# Patient Record
Sex: FEMALE | Race: WHITE | NOT HISPANIC OR LATINO | Employment: UNEMPLOYED | URBAN - METROPOLITAN AREA
[De-identification: names, ages, dates, MRNs, and addresses within clinical notes are randomized per-mention and may not be internally consistent; named-entity substitution may affect disease eponyms.]

---

## 2017-10-03 ENCOUNTER — OFFICE VISIT - RIVER FALLS (OUTPATIENT)
Dept: FAMILY MEDICINE | Facility: CLINIC | Age: 62
End: 2017-10-03

## 2017-11-09 ENCOUNTER — AMBULATORY - RIVER FALLS (OUTPATIENT)
Dept: FAMILY MEDICINE | Facility: CLINIC | Age: 62
End: 2017-11-09

## 2017-11-10 LAB
CHOLEST SERPL-MCNC: 269 MG/DL
CHOLEST/HDLC SERPL: 4.6 {RATIO}
CREAT SERPL-MCNC: 0.87 MG/DL (ref 0.5–0.99)
GLUCOSE BLD-MCNC: 92 MG/DL (ref 65–99)
HDLC SERPL-MCNC: 59 MG/DL
LDLC SERPL CALC-MCNC: 150 MG/DL
NONHDLC SERPL-MCNC: 210 MG/DL
TRIGL SERPL-MCNC: 388 MG/DL

## 2017-11-16 ENCOUNTER — OFFICE VISIT - RIVER FALLS (OUTPATIENT)
Dept: FAMILY MEDICINE | Facility: CLINIC | Age: 62
End: 2017-11-16

## 2017-11-16 ASSESSMENT — MIFFLIN-ST. JEOR: SCORE: 1280.13

## 2018-12-19 ENCOUNTER — AMBULATORY - RIVER FALLS (OUTPATIENT)
Dept: FAMILY MEDICINE | Facility: CLINIC | Age: 63
End: 2018-12-19

## 2018-12-20 LAB
CHOLEST SERPL-MCNC: 299 MG/DL
CHOLEST/HDLC SERPL: 4.2 {RATIO}
CREAT SERPL-MCNC: 1.01 MG/DL (ref 0.5–0.99)
GLUCOSE BLD-MCNC: 90 MG/DL (ref 65–99)
HDLC SERPL-MCNC: 71 MG/DL
LDLC SERPL CALC-MCNC: 192 MG/DL
NONHDLC SERPL-MCNC: 228 MG/DL
TRIGL SERPL-MCNC: 183 MG/DL

## 2018-12-28 ENCOUNTER — OFFICE VISIT - RIVER FALLS (OUTPATIENT)
Dept: FAMILY MEDICINE | Facility: CLINIC | Age: 63
End: 2018-12-28

## 2018-12-28 ASSESSMENT — MIFFLIN-ST. JEOR: SCORE: 1263.8

## 2019-08-21 ENCOUNTER — COMMUNICATION - RIVER FALLS (OUTPATIENT)
Dept: FAMILY MEDICINE | Facility: CLINIC | Age: 64
End: 2019-08-21

## 2020-02-18 ENCOUNTER — OFFICE VISIT - RIVER FALLS (OUTPATIENT)
Dept: FAMILY MEDICINE | Facility: CLINIC | Age: 65
End: 2020-02-18

## 2020-02-19 ENCOUNTER — COMMUNICATION - RIVER FALLS (OUTPATIENT)
Dept: FAMILY MEDICINE | Facility: CLINIC | Age: 65
End: 2020-02-19

## 2020-02-19 LAB
BUN SERPL-MCNC: 12 MG/DL (ref 7–25)
BUN/CREAT RATIO - HISTORICAL: ABNORMAL (ref 6–22)
CALCIUM SERPL-MCNC: 9.8 MG/DL (ref 8.6–10.4)
CHLORIDE BLD-SCNC: 100 MMOL/L (ref 98–110)
CHOLEST SERPL-MCNC: 304 MG/DL
CHOLEST/HDLC SERPL: 4.1 {RATIO}
CO2 SERPL-SCNC: 28 MMOL/L (ref 20–32)
CREAT SERPL-MCNC: 0.92 MG/DL (ref 0.5–0.99)
EGFRCR SERPLBLD CKD-EPI 2021: 66 ML/MIN/1.73M2
GLUCOSE BLD-MCNC: 100 MG/DL (ref 65–99)
HDLC SERPL-MCNC: 74 MG/DL
LDLC SERPL CALC-MCNC: 188 MG/DL
NONHDLC SERPL-MCNC: 230 MG/DL
POTASSIUM BLD-SCNC: 4 MMOL/L (ref 3.5–5.3)
SODIUM SERPL-SCNC: 139 MMOL/L (ref 135–146)
TRIGL SERPL-MCNC: 227 MG/DL
TSH SERPL DL<=0.005 MIU/L-ACNC: 0.82 MIU/L (ref 0.4–4.5)

## 2020-08-18 ENCOUNTER — OFFICE VISIT - RIVER FALLS (OUTPATIENT)
Dept: FAMILY MEDICINE | Facility: CLINIC | Age: 65
End: 2020-08-18

## 2020-08-19 ENCOUNTER — COMMUNICATION - RIVER FALLS (OUTPATIENT)
Dept: FAMILY MEDICINE | Facility: CLINIC | Age: 65
End: 2020-08-19

## 2020-08-19 LAB
CHOLEST SERPL-MCNC: 216 MG/DL
CHOLEST/HDLC SERPL: 3 {RATIO}
HDLC SERPL-MCNC: 73 MG/DL
LDLC SERPL CALC-MCNC: 105 MG/DL
NONHDLC SERPL-MCNC: 143 MG/DL
TRIGL SERPL-MCNC: 266 MG/DL

## 2020-09-01 ENCOUNTER — COMMUNICATION - RIVER FALLS (OUTPATIENT)
Dept: FAMILY MEDICINE | Facility: CLINIC | Age: 65
End: 2020-09-01

## 2021-03-02 ENCOUNTER — COMMUNICATION - RIVER FALLS (OUTPATIENT)
Dept: FAMILY MEDICINE | Facility: CLINIC | Age: 66
End: 2021-03-02

## 2021-04-05 ENCOUNTER — COMMUNICATION - RIVER FALLS (OUTPATIENT)
Dept: FAMILY MEDICINE | Facility: CLINIC | Age: 66
End: 2021-04-05

## 2021-04-14 ENCOUNTER — COMMUNICATION - RIVER FALLS (OUTPATIENT)
Dept: FAMILY MEDICINE | Facility: CLINIC | Age: 66
End: 2021-04-14

## 2021-04-15 ENCOUNTER — AMBULATORY - RIVER FALLS (OUTPATIENT)
Dept: FAMILY MEDICINE | Facility: CLINIC | Age: 66
End: 2021-04-15

## 2021-04-15 ENCOUNTER — OFFICE VISIT - RIVER FALLS (OUTPATIENT)
Dept: FAMILY MEDICINE | Facility: CLINIC | Age: 66
End: 2021-04-15

## 2021-04-15 ASSESSMENT — MIFFLIN-ST. JEOR: SCORE: 1252.01

## 2021-04-16 ENCOUNTER — COMMUNICATION - RIVER FALLS (OUTPATIENT)
Dept: FAMILY MEDICINE | Facility: CLINIC | Age: 66
End: 2021-04-16

## 2021-04-16 LAB
BUN SERPL-MCNC: 12 MG/DL (ref 7–25)
BUN/CREAT RATIO - HISTORICAL: 12 (ref 6–22)
CALCIUM SERPL-MCNC: 10.1 MG/DL (ref 8.6–10.4)
CHLORIDE BLD-SCNC: 103 MMOL/L (ref 98–110)
CHOLEST SERPL-MCNC: 202 MG/DL
CHOLEST/HDLC SERPL: 2.8 {RATIO}
CO2 SERPL-SCNC: 26 MMOL/L (ref 20–32)
CREAT SERPL-MCNC: 1.02 MG/DL (ref 0.5–0.99)
EGFRCR SERPLBLD CKD-EPI 2021: 58 ML/MIN/1.73M2
GLUCOSE BLD-MCNC: 102 MG/DL (ref 65–99)
HDLC SERPL-MCNC: 73 MG/DL
LDLC SERPL CALC-MCNC: 104 MG/DL
NONHDLC SERPL-MCNC: 129 MG/DL
POTASSIUM BLD-SCNC: 4.2 MMOL/L (ref 3.5–5.3)
SODIUM SERPL-SCNC: 139 MMOL/L (ref 135–146)
TRIGL SERPL-MCNC: 156 MG/DL
TSH SERPL DL<=0.005 MIU/L-ACNC: 0.82 MIU/L (ref 0.4–4.5)

## 2021-05-13 ENCOUNTER — AMBULATORY - RIVER FALLS (OUTPATIENT)
Dept: FAMILY MEDICINE | Facility: CLINIC | Age: 66
End: 2021-05-13

## 2021-11-10 ENCOUNTER — OFFICE VISIT - RIVER FALLS (OUTPATIENT)
Dept: FAMILY MEDICINE | Facility: CLINIC | Age: 66
End: 2021-11-10

## 2021-11-10 ASSESSMENT — MIFFLIN-ST. JEOR: SCORE: 1236.13

## 2022-02-11 VITALS
WEIGHT: 168.6 LBS | SYSTOLIC BLOOD PRESSURE: 138 MMHG | TEMPERATURE: 98.3 F | HEART RATE: 64 BPM | OXYGEN SATURATION: 98 % | DIASTOLIC BLOOD PRESSURE: 76 MMHG | BODY MASS INDEX: 30.59 KG/M2

## 2022-02-11 VITALS
HEIGHT: 62 IN | WEIGHT: 166.4 LBS | TEMPERATURE: 98 F | HEART RATE: 69 BPM | OXYGEN SATURATION: 98 % | SYSTOLIC BLOOD PRESSURE: 138 MMHG | BODY MASS INDEX: 30.62 KG/M2 | DIASTOLIC BLOOD PRESSURE: 58 MMHG

## 2022-02-11 VITALS
TEMPERATURE: 97.6 F | DIASTOLIC BLOOD PRESSURE: 70 MMHG | HEIGHT: 62 IN | HEART RATE: 62 BPM | RESPIRATION RATE: 20 BRPM | SYSTOLIC BLOOD PRESSURE: 152 MMHG | DIASTOLIC BLOOD PRESSURE: 84 MMHG | BODY MASS INDEX: 31.53 KG/M2 | SYSTOLIC BLOOD PRESSURE: 138 MMHG | WEIGHT: 172.6 LBS | HEIGHT: 62 IN | HEART RATE: 60 BPM | BODY MASS INDEX: 31.76 KG/M2 | TEMPERATURE: 98.7 F

## 2022-02-11 VITALS
OXYGEN SATURATION: 98 % | DIASTOLIC BLOOD PRESSURE: 76 MMHG | HEIGHT: 62 IN | BODY MASS INDEX: 29.98 KG/M2 | HEART RATE: 67 BPM | TEMPERATURE: 98.1 F | WEIGHT: 162.9 LBS | SYSTOLIC BLOOD PRESSURE: 168 MMHG

## 2022-02-11 VITALS
SYSTOLIC BLOOD PRESSURE: 138 MMHG | WEIGHT: 164 LBS | HEART RATE: 63 BPM | TEMPERATURE: 98.3 F | DIASTOLIC BLOOD PRESSURE: 80 MMHG | BODY MASS INDEX: 29.76 KG/M2 | OXYGEN SATURATION: 98 %

## 2022-02-11 VITALS
BODY MASS INDEX: 31.1 KG/M2 | SYSTOLIC BLOOD PRESSURE: 128 MMHG | HEART RATE: 70 BPM | HEIGHT: 62 IN | WEIGHT: 169 LBS | TEMPERATURE: 98.6 F | DIASTOLIC BLOOD PRESSURE: 72 MMHG

## 2022-02-16 NOTE — LETTER
(Inserted Image. Unable to display)            April 16, 2021      ZE CASTLE      [address] [place] [id number]        Dear ZE,    Thank you for selecting [facility] for your healthcare needs.  Below you will find the results of the recent tests done at our clinic.      Labs are all acceptable, Ze.  The thyroid level is in normal range, I sent refills for your current dose of levothyroxine to the pharmacy for one year.       Result Name Current Result Previous Result Reference Range   Sodium Level (mmol/L)  139 4/15/2021  139 2/18/2020 135 - 146   Potassium Level (mmol/L)  4.2 4/15/2021  4.0 2/18/2020 3.5 - 5.3   Chloride Level (mmol/L)  103 4/15/2021  100 2/18/2020 98 - 110   CO2 Level (mmol/L)  26 4/15/2021  28 2/18/2020 20 - 32   Glucose Level (mg/dL) ((H)) 102 4/15/2021 ((H)) 100 2/18/2020 65 - 99   BUN (mg/dL)  12 4/15/2021  12 2/18/2020 7 - 25   Creatinine Level (mg/dL) ((H)) 1.02 4/15/2021  0.92 2/18/2020 0.50 - 0.99   BUN/Creat Ratio  12 4/15/2021  NOT APPLICABLE 2/18/2020 6 - 22   eGFR (mL/min/1.73m2) ((L)) 58 4/15/2021  66 2/18/2020 > OR = 60 -    eGFR  (mL/min/1.73m2)  67 4/15/2021  76 2/18/2020 > OR = 60 -    Calcium Level (mg/dL)  10.1 4/15/2021  9.8 2/18/2020 8.6 - 10.4   Cholesterol (mg/dL) ((H)) 202 4/15/2021 ((H)) 216 8/18/2020  - <200   Non-HDL Cholesterol  129 4/15/2021 ((H)) 143 8/18/2020  - <130   HDL (mg/dL)  73 4/15/2021  73 8/18/2020 > OR = 50 -    Cholesterol/HDL Ratio  2.8 4/15/2021  3.0 8/18/2020  - <5.0   LDL ((H)) 104 4/15/2021 ((H)) 105 8/18/2020    Triglyceride (mg/dL) ((H)) 156 4/15/2021 ((H)) 266 8/18/2020  - <150   TSH (mIU/L)  0.82 4/15/2021  0.82 2/18/2020 0.40 - 4.50       Please contact me or my assistant at [phone] if you have any questions or concerns.     Sincerely,        NANDINI Bacon-NP  Family Nurse Practitioner      What do your labs mean?  Below is a glossary of commonly ordered labs:  LDL   Bad  Cholesterol   HDL   Good Cholesterol  AST/ALT   Liver Function   Cr/Creatinine   Kidney Function  Microalbumin   Kidney Function  BUN   Kidney Function  PSA   Prostate    TSH   Thyroid Hormone  HgbA1c   Diabetes Test   Hgb (Hemoglobin)   Red Blood Cells  WBC   White Blood Cell Count

## 2022-02-16 NOTE — TELEPHONE ENCOUNTER
Entered by April Lunsford CMA on January 06, 2020 6:53:40 AM CST  Med Refill      Date of last office visit and reason:  12/28/18; med check      Date of last Med Check / Px:   12/28/18  Date of last labs pertaining to med:  12/19/18    RTC order in chart:  yes; due now    For Protocol refill, has patient been contacted:  msg sent to pharmacy        ------------------------------------------  From: [facility]/pharmacy #[id number]  To: Emmy Pa  Sent: January 5, 2020 11:12:52 AM CST  Subject: Medication Management  Due: January 6, 2020 11:12:52 AM CST    ** On Hold Pending Signature **  Drug: levothyroxine (levothyroxine 125 mcg (0.125 mg) oral tablet)  TAKE 1 TABLET BY MOUTH EVERY DAY  Quantity: 90 tab(s)  Days Supply: 90  Refills: 3  Substitutions Allowed  Notes from Pharmacy:     Dispensed Drug: levothyroxine (levothyroxine 125 mcg (0.125 mg) oral tablet)  TAKE 1 TABLET BY MOUTH EVERY DAY  Quantity: 90 tab(s)  Days Supply: 90  Refills: 3  Substitutions Allowed  Notes from Pharmacy:   ------------------------------------------

## 2022-02-16 NOTE — LETTER
(Inserted Image. Unable to display)   October 20, 2021    ZE CASTLE  [address] [place] [phone]            Dear ZE,      Thank you for selecting [facility] for your healthcare needs.    Our records indicate you are due for the following services:     Medicare Annual Wellness Visit.    (FYI   Regarding office visits: In some instances, a video visit or telephone visit may be offered as an option.)      To schedule an appointment or if you have further questions, please contact your clinic at [phone].      Powered by [employer]    Sincerely,    CARLOS Whitaker

## 2022-02-16 NOTE — NURSING NOTE
Faxed completed Cologuard order form, demographic sheet, and insurance information to "[employer] at [phone]. Confirmation received.

## 2022-02-16 NOTE — TELEPHONE ENCOUNTER
Entered by Patricia Canada MA on March 08, 2021 10:18:33 AM CST  ---------------------  From: Patricia Canada MA   To: [facility] #[id number]    Sent: 3/8/2021 10:18:33 AM CST  Subject: Medication Management     ** Submitted: **  Order:levothyroxine (levothyroxine 125 mcg (0.125 mg) oral tablet)  1 tab(s)  Oral  daily  Qty:  30 tab(s)        Refills:  0          Substitutions Allowed     [id number]    Signed by Patricia Canada MA  3/8/2021 4:18:00 PM [facility]    ** Submitted: **  Complete:levothyroxine (levothyroxine 125 mcg (0.125 mg) oral tablet)   Signed by Patricia Canada MA  3/8/2021 4:18:00 PM [facility]    ** Not Approved:  **  levothyroxine (LEVOTHYROXINE 125 MCG TABLET)  TAKE 1 TABLET BY MOUTH EVERY DAY  Qty:  90 tab(s)        Days Supply:  90        Refills:  2          Substitutions Allowed     Route To Pharmacy - [facility] #[id number]   Signed by Patricia Canada MA            ** Patient matched by Patricia Canada MA on 3/8/2021 10:16:41 AM CST **      ------------------------------------------  From: [facility] STORE [facility]  To: Emmy Pa  Sent: March 6, 2021 9:25:01 AM CST  Subject: Medication Management  Due: March 3, 2021 5:24:05 PM CST     ** On Hold Pending Signature **     Dispensed Drug: levothyroxine (levothyroxine 125 mcg (0.125 mg) oral tablet), TAKE 1 TABLET BY MOUTH EVERY DAY  Quantity: 90 tab(s)  Days Supply: 90  Refills: 2  Substitutions Allowed  Notes from Pharmacy:  ------------------------------------------LV:  8/18/2020 w/ NCB for CDM f/u.  Please see 3/5/2021 phone note, pt aware of appt, in [place] until mid April

## 2022-02-16 NOTE — NURSING NOTE
Asthma Control Test (ACT) Total Entered On:  4/15/2021 12:58 PM CDT    Performed On:  4/15/2021 12:58 PM CDT by Nereida Chairez LPN               Asthma Control Test (ACT) Total   Asthma Control Test Total (Adult) :   22    Asthma Action Plan Provided? :   Yes   Nereida Chairez LPN - 4/15/2021 12:58 PM CDT

## 2022-02-16 NOTE — TELEPHONE ENCOUNTER
Entered by Patricia Canada MA on October 18, 2021 11:38:14 AM CDT  ---------------------  From: Patricia Canada MA   To: [place], [place]    Sent: 10/18/2021 11:38:13 AM CDT  Subject: Medication Management     ** Not Approved: Refill not appropriate, Refilled // for #30.  Pt due for appt for further refills. **  amLODIPine (AMLODIPINE BESYLATE 10MG TAB)  TAKE ONE TABLET BY MOUTH EVERY DAY  Qty:  90 tab(s)        Days Supply:  90        Refills:  1          Substitutions Allowed     Route To Pharmacy - [place], [place]   Signed by Patricia Canada MA            ** Patient matched by Patricia Canada MA on 10/18/2021 11:37:09 AM CDT **      ------------------------------------------  From: [place], [place]  To: Emmy Pa  Sent: October 13, 2021 8:15:46 AM CDT  Subject: Medication Management  Due: October 2, 2021 3:22:27 PM CDT     ** On Hold Pending Signature **     Drug: amLODIPine (Norvasc 10 mg oral tablet), TAKE ONE TABLET BY MOUTH EVERY DAY  Quantity: 90 tab(s)  Days Supply: 90  Refills: 0  Substitutions Allowed  Notes from Pharmacy:     Dispensed Drug: amLODIPine (amLODIPine 10 mg oral tablet), TAKE ONE TABLET BY MOUTH EVERY DAY  Quantity: 90 tab(s)  Days Supply: 90  Refills: 1  Substitutions Allowed  Notes from Pharmacy:  ------------------------------------------

## 2022-02-16 NOTE — PROGRESS NOTES
Patient:   ZE CASTLE            MRN: [id number]            FIN: [id number]               Age:   64 years     Sex:  Female     :  1955   Associated Diagnoses:   Asthma, mild intermittent; Hyperlipidemia; Hypertension; Hypothyroidism   Author:   Emmy Pa      Visit Information      Date of Service: 2020 08:31 am  Performing Location: [facility]  Encounter#: [id number]      Primary Care Provider (PCP):  ROCIO -UNKNOWN,    NPI# [id number]      Referring Provider:  Emmy Pa    NPI# [id number]      Chief Complaint   2020 8:40 AM CST    med check/refills - is due for some labs today        History of Present Illness   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  She ran out of potassium, so using an OTC sub  She ran out of Losartan about 5 days ago, she has questions about the recall. She does not like being on her bp meds but will continue  She has no SOB or chest pain or ankle swelling  does not want to be on a statin, has reduced cholesterol intake as eliminating some did help with her   asthma sympotms which are well controlled, see ACT and AAP  non smoker      Health Status   Allergies:    Allergic Reactions (Selected)  Severity Not Documented  Mold (No reactions were documented)   Medications:  (Selected)   Prescriptions  Prescribed  Metoprolol Succinate  mg oral tablet, extended release: See Instructions, Instructions: TAKE 1 TABLET BY MOUTH EVERY DAY, # 90 tab(s), 1 Refill(s), KLARISSA, Type: Maintenance, Pharmacy: [facility] STORE [id number], TAKE 1 TABLET BY MOUTH EVERY DAY  albuterol 90 mcg/inh inhalation aerosol: 2 puff(s), NEB, q4 hrs, PRN: AS NEEDED FOR SHORTNESS OF BREATH OR WHEEZING, # 1 EA, 2 Refill(s), Type: Maintenance, Pharmacy: [facility]/pharmacy #[id number], 2 puff(s) NEB q4 hrs,PRN:AS NEEDED FOR SHORTNESS OF BREATH OR WHEEZING  amLODIPine 10 mg oral tablet: = 1 tab(s) ( 10 mg ), Oral, daily, # 90  tab(s), 1 Refill(s), Type: Maintenance, [id number], 1 tab(s) Oral daily  hydroCHLOROthiazide 25 mg oral tablet: = 1 tab(s) ( 25 mg ), Oral, daily, # 90 tab(s), 1 Refill(s), Type: Maintenance, Pharmacy: [facility]/pharmacy #[id number], 1 tab(s) Oral daily  levothyroxine 125 mcg (0.125 mg) oral tablet: = 1 tab(s), Oral, daily, # 30 tab(s), 0 Refill(s), Type: Maintenance, Pharmacy: [facility]/pharmacy #[id number], Needs appt for further refills  potassium chloride 10 mEq oral capsule, extended release: = 1 cap(s) ( 10 mEq ), PO, daily, # 90 cap(s), 3 Refill(s), Type: Maintenance, [id number], 1 cap(s) Oral daily  valsartan 80 mg oral tablet: = 1 tab(s) ( 80 mg ), Oral, daily, Instructions: to replace losartan, # 90 tab(s), 1 Refill(s), Type: Maintenance, [id number], 1 tab(s) Oral daily,Instr:to replace losartan   Problem list:    All Problems  Aortic valve calcification / SNOMED CT 972198692 / Confirmed  Asthma, mild intermittent / SNOMED CT 6672501636 / Confirmed  Ex-smoker / SNOMED CT 60737980 / Confirmed  Hypertension / SNOMED CT 7480216957 / Confirmed  Hypothyroidism / SNOMED CT 590622929 / Confirmed  Currently euthyroid  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  Obese / SNOMED CT 5004076950 / Probable  Osteopenia / SNOMED CT 732510262 / Confirmed  Seasonal allergies / SNOMED CT 911005503 / Confirmed  Inactive: Eczema / SNOMED CT 04258107  Inactive: Finger mass, left / SNOMED CT 402710061  Inactive: Has received third dose of hepatitis B vaccine / SNOMED CT 7920265122  Inactive: Irritant contact dermatitis versus autoimmune process  Dermatology consult on 05/10/2005  Resolved: Mild persistent asthma / SNOMED CT 3598013775  Resolved: Rash, skin / SNOMED CT 540259948  Canceled: Asthma  Childhood  Canceled: Eczema  Canceled: Eczema  Canceled: Osteopenia      Histories   Past Medical History:    Active  Keratoacanthoma  (405943637): Onset in  at 52 years.  Comments:  2011 CST 11:38 AM Gilda Cox  Not definitive diagnosis, suspected, right arm.  Hypertension (7913549754)  Asthma, mild intermittent (9315275962)  Ex-smoker (41761482)  Hypothyroidism (263122379)  Comments:  2011 CST 10:38 AM Gilda Cox  Currently euthyroid  Seasonal allergies (558156517)  Osteopenia (156009358)  Obese (1373810599)  Aortic valve calcification (587537824)  Resolved  Rash, skin (216647848): Onset in the month of 2005 at 49 years  Resolved.  Mild persistent asthma (5281427442):  Resolved.   Family History:    Hypothyroid  Sister  Hypertension  Mother  Brother  Osteoporosis  Mother  Grandmother (M)     Procedure history:    Repair of shoulder (SNOMED CT 716295910) in  at 36 Years.  Comments:  2011 11:51 AM Gilda Cox  Repair of left shoulder dislocation.  Childbirth (SNOMED CT 5403859198) in  at 21 Years.  Comments:  2011 11:46 AM Gilda Cox  .  Toxemia at very end.  Tubal ligation (SNOMED CT 456500883).  Cardiac computed tomography for calcium scoring (SNOMED CT 2699558864).  Comments:  9/10/2014 9:51 AM [facility] - Cinthya Lopez CMA  Done 2014 - results Zero  Do an Echo in 1-2 years because of mild degeneration in the aortic valve   Social History:        Alcohol Assessment            Wine, 1-2 times per week      Substance Abuse Assessment            Never      Other Assessment            Immunization:, Per paper chart patient completed Hepatitis B series in . No dates. Not listed on WIR.            Marital Status,         Physical Examination   Vital Signs   2020 8:40 AM CST Temperature Tympanic 98.3 DegF    Peripheral Pulse Rate 63 bpm    Systolic Blood Pressure 142 mmHg  HI    Diastolic Blood Pressure 74 mmHg    Mean Arterial Pressure 97 mmHg    BP Site Right arm    BP Method Manual    Oxygen Saturation 98 %      Measurements from flowsheet : Measurements   2020 8:40 AM  CST    Weight Measured - Standard                164 lb     General:  Alert and oriented, No acute distress.    Eye:  Normal conjunctiva.    HENT:  Normal hearing, Oral mucosa is moist, No pharyngeal erythema.    Neck:  Supple, Non-tender, No lymphadenopathy.    Respiratory:  Lungs are clear to auscultation, Respirations are non-labored, Breath sounds are equal, Symmetrical chest wall expansion.    Cardiovascular:  Normal rate, Regular rhythm, No murmur.    Musculoskeletal:  Normal range of motion, Normal gait.    Integumentary:  Warm, Dry, Pink, No rash.    Neurologic:  Alert, Oriented.    Psychiatric:  Cooperative.       Impression and Plan   Diagnosis     Asthma, mild intermittent (ACB59-PN J45.20).     Hyperlipidemia (KUV12-RE E78.5).     Hypertension (IAF23-EY I10).     Hypothyroidism (JBY08-MA E06.3).     Plan:  will check potassium and if normal range, she can continue with OTC supplement, I did sent rx KCL if not in range.  await TSH, thyroid seems under good control  BP--will stop Losartan and change to Valsartan  recheck meds 6 months.    Patient Instructions:       Counseled: Patient, Regarding diagnosis, Regarding treatment, Regarding medications, Verbalized understanding.    Orders     Orders (Selected)   Outpatient Orders  Ordered (Dispatched)  Basic Metabolic Panel* ([facility]): Specimen Type: Serum, Collection Date: 02/18/20 9:15:00 CST  Lipid panel with reflex to direct ldl* ([facility]): Specimen Type: Serum, Collection Date: 02/18/20 9:15:00 CST  TSH* ([facility]): Specimen Type: Serum, Collection Date: 02/18/20 9:15:00 CST  Prescriptions  Prescribed  Metoprolol Succinate  mg oral tablet, extended release: See Instructions, Instructions: TAKE 1 TABLET BY MOUTH EVERY DAY, # 90 tab(s), 1 Refill(s), KLARISSA, Type: Maintenance, Pharmacy: [facility] STORE [id number], TAKE 1 TABLET BY MOUTH EVERY DAY  albuterol 90 mcg/inh inhalation aerosol: 2 puff(s), NEB, q4 hrs, PRN: AS NEEDED FOR SHORTNESS OF BREATH OR WHEEZING, # 1 EA, 2  Refill(s), Type: Maintenance, Pharmacy: [facility]/pharmacy #[id number], 2 puff(s) NEB q4 hrs,PRN:AS NEEDED FOR SHORTNESS OF BREATH OR WHEEZING  amLODIPine 10 mg oral tablet: = 1 tab(s) ( 10 mg ), Oral, daily, # 90 tab(s), 1 Refill(s), Type: Maintenance, Pharmacy: [facility]/pharmacy #[id number], 1 tab(s) Oral daily  hydroCHLOROthiazide 25 mg oral tablet: = 1 tab(s) ( 25 mg ), Oral, daily, # 90 tab(s), 1 Refill(s), Type: Maintenance, Pharmacy: [facility]/pharmacy #[id number], 1 tab(s) Oral daily  levothyroxine 125 mcg (0.125 mg) oral tablet: = 1 tab(s), Oral, daily, # 30 tab(s), 0 Refill(s), Type: Maintenance, Pharmacy: [facility]/pharmacy #[id number], Needs appt for further refills  potassium chloride 10 mEq oral capsule, extended release: = 1 cap(s) ( 10 mEq ), PO, daily, # 90 cap(s), 3 Refill(s), Type: Maintenance, Pharmacy: [facility]/pharmacy #[id number], 1 cap(s) Oral daily  valsartan 80 mg oral tablet: = 1 tab(s) ( 80 mg ), Oral, daily, Instructions: to replace losartan, # 90 tab(s), 1 Refill(s), Type: Maintenance, Pharmacy: [facility]/pharmacy #[id number], 1 tab(s) Oral daily,Instr:to replace losartan.

## 2022-02-16 NOTE — TELEPHONE ENCOUNTER
Entered by April Lunsford CMA on April 20, 2021 2:53:28 PM CDT  ---------------------  From: April Lunsford CMA   To: [facility]/pharmacy #[id number]    Sent: 4/20/2021 2:53:28 PM CDT  Subject: Medication Management     ** Rx Change Denied: Change not appropriate, #90, 3 sent 4/16/21 **  (LEVOTHYROXINE 125 MCG TABLET)   TAKE 1 TABLET BY MOUTH EVERY DAY  Qty:  30 tab(s)        Days Supply:  30        Refills:  0          Substitutions Allowed     Route To Pharmacy - [facility]/pharmacy #[id number]   Note from Pharmacy:  REQUEST FOR 90 DAYS PRESCRIPTION.  Signed by April Lunsford CMA            From: [facility] STORE [id number]  To: Emmy Pa  Sent: April 19, 2021 9:10:24 AM CDT  Subject: Medication Management  Due: April 20, 2021 9:10:24 AM CDT     Originally Prescribed Drug:  Drug: levothyroxine (levothyroxine 125 mcg (0.125 mg) oral tablet), TAKE 1 TABLET BY MOUTH EVERY DAY  Quantity: 30 tab(s)  Days Supply: 30  Refills: 0  Substitutions Allowed  Notes from Pharmacy: REQUEST FOR 90 DAYS PRESCRIPTION.     ** On Hold Pending Signature **  Preferred Alternative Drug: levothyroxine (levothyroxine 125 mcg (0.125 mg) oral tablet), TAKE 1 TABLET BY MOUTH EVERY DAY  Quantity: 90 tab(s)  Days Supply: 90  Refills: 1  Substitutions Allowed  Notes from Pharmacy: REQUEST FOR 90 DAYS PRESCRIPTION.

## 2022-02-16 NOTE — NURSING NOTE
Depression Screening Entered On:  2/18/2020 9:25 AM CST    Performed On:  2/18/2020 9:24 AM CST by Nereida Chairez LPN               Depression Screening   Little Interest - Pleasure in Activities :   Not at all   Feeling Down, Depressed, Hopeless :   Not at all   Initial Depression Screen Score :   0    Trouble Falling or Staying Asleep :   Not at all   Feeling Tired or Little Energy :   Not at all   Poor Appetite or Overeating :   Not at all   Feeling Bad About Yourself :   Not at all   Trouble Concentrating :   Not at all   Moving or Speaking Slowly :   Not at all   Thoughts Better Off Dead or Hurting Self :   Not at all   Detailed Depression Screen Score :   0    Total Depression Screen Score :   0    BRENT Difficulty with Work, Home, Others :   Not difficult at all   Nereida Chairez LPN - 2/18/2020 9:24 AM CST

## 2022-02-16 NOTE — TELEPHONE ENCOUNTER
Entered by Laura Kothari CMA on April 14, 2021 10:51:59 AM CDT  ---------------------  From: Laura Kothari CMA   To: [facility]/pharmacy #[id number]    Sent: 4/14/2021 10:51:58 AM CDT  Subject: Medication Management     ** Not Approved: Pt has appt tomorrow **  potassium chloride (POTASSIUM CL ER 10 MEQ CAPSULE)  TAKE 1 CAPSULE BY MOUTH EVERY DAY  Qty:  30 cap(s)        Days Supply:  30        Refills:  0          Substitutions Allowed     Route To Pharmacy - [facility]/pharmacy #[id number]   Signed by Laura Kothari CMA            ** Patient matched by Laura Kothari CMA on 4/14/2021 10:45:42 AM CDT **      ------------------------------------------  From: [facility] STORE [id number]  To: Emmy Pa  Sent: April 12, 2021 11:59:27 AM CDT  Subject: Medication Management  Due: April 7, 2021 1:44:44 PM CDT     ** On Hold Pending Signature **     Dispensed Drug: potassium chloride (potassium chloride 10 mEq oral capsule, extended release), TAKE 1 CAPSULE BY MOUTH EVERY DAY  Quantity: 30 cap(s)  Days Supply: 30  Refills: 0  Substitutions Allowed  Notes from Pharmacy:  ------------------------------------------

## 2022-02-16 NOTE — NURSING NOTE
Asthma Assessment Entered On:  4/15/2021 10:36 AM CDT    Performed On:  4/15/2021 10:36 AM CDT by Emmy Pa               History   Asthma Severity :   Intermittent   Emmy Pa - 4/15/2021 10:36 AM CDT   Asthma Precipitating Factors Grid   Upper Respiratory Infection :   Yes   Emmy Pa - 4/15/2021 10:36 AM CDT

## 2022-02-16 NOTE — NURSING NOTE
Comprehensive Intake Entered On:  2/18/2020 8:43 AM CST    Performed On:  2/18/2020 8:40 AM CST by Nereida Chairez LPN               Summary   Chief Complaint :   med check/refills - is due for some labs today   Weight Measured :   164 lb(Converted to: 164 lb 0 oz, 74.39 kg)    Systolic Blood Pressure :   142 mmHg (HI)    Diastolic Blood Pressure :   74 mmHg   Mean Arterial Pressure :   97 mmHg   Peripheral Pulse Rate :   63 bpm   BP Site :   Right arm   BP Method :   Manual   Temperature Tympanic :   98.3 DegF(Converted to: 36.8 DegC)    Oxygen Saturation :   98 %   Nereida Chairez LPN - 2/18/2020 8:40 AM CST   Health Status   Allergies Verified? :   Yes   Medication History Verified? :   Yes   Medical History Verified? :   No   Pre-Visit Planning Status :   Completed   Tobacco Use? :   Former smoker   Nereida Chairez LPN - 2/18/2020 8:40 AM CST   Meds / Allergies   (As Of: 2/18/2020 8:43:54 AM CST)   Allergies (Active)   Mold  Estimated Onset Date:   Unspecified ; Created By:   Gilda Arango; Reaction Status:   Active ; Category:   Drug ; Substance:   Mold ; Type:   Allergy ; Updated By:   Gilda Arango; Reviewed Date:   12/28/2018 9:25 AM CST        Medication List   (As Of: 2/18/2020 8:43:54 AM CST)   Prescription/Discharge Order    potassium chloride  :   potassium chloride ; Status:   Prescribed ; Ordered As Mnemonic:   potassium chloride 10 mEq oral capsule, extended release ; Simple Display Line:   10 mEq, 1 cap(s), PO, daily, for 90 day(s), 90 cap(s), 0 Refill(s) ; Ordering Provider:   Emmy Pa; Catalog Code:   potassium chloride ; Order Dt/Tm:   12/28/2018 9:43:10 AM CST          amLODIPine  :   amLODIPine ; Status:   Prescribed ; Ordered As Mnemonic:   amLODIPine 10 mg oral tablet ; Simple Display Line:   10 mg, 1 tab(s), Oral, daily, 30 tab(s), 0 Refill(s) ; Ordering Provider:   Emmy Pa; Catalog Code:   amLODIPine ; Order Dt/Tm:   2/3/2020 8:00:58 AM CST          losartan  :    losartan ; Status:   Prescribed ; Ordered As Mnemonic:   losartan 100 mg oral tablet ; Simple Display Line:   100 mg, 1 tab(s), Oral, daily, 30 tab(s), 0 Refill(s) ; Ordering Provider:   Emmy Pa; Catalog Code:   losartan ; Order Dt/Tm:   2/3/2020 8:00:58 AM CST          hydroCHLOROthiazide  :   hydroCHLOROthiazide ; Status:   Prescribed ; Ordered As Mnemonic:   hydroCHLOROthiazide 25 mg oral tablet ; Simple Display Line:   25 mg, 1 tab(s), Oral, daily, 30 tab(s), 0 Refill(s) ; Ordering Provider:   Emmy Pa; Catalog Code:   hydroCHLOROthiazide ; Order Dt/Tm:   2/3/2020 8:00:58 AM CST          levothyroxine  :   levothyroxine ; Status:   Prescribed ; Ordered As Mnemonic:   levothyroxine 125 mcg (0.125 mg) oral tablet ; Simple Display Line:   1 tab(s), Oral, daily, 30 tab(s), 0 Refill(s) ; Ordering Provider:   Emmy Pa; Catalog Code:   levothyroxine ; Order Dt/Tm:   2/3/2020 8:00:37 AM CST          albuterol  :   albuterol ; Status:   Prescribed ; Ordered As Mnemonic:   albuterol 90 mcg/inh inhalation aerosol ; Simple Display Line:   2 puff(s), NEB, q4 hrs, PRN: AS NEEDED FOR SHORTNESS OF BREATH OR WHEEZING, 1 EA, 2 Refill(s) ; Ordering Provider:   Emmy Pa; Catalog Code:   albuterol ; Order Dt/Tm:   8/21/2019 8:54:49 AM CDT          metoprolol  :   metoprolol ; Status:   Prescribed ; Ordered As Mnemonic:   Metoprolol Succinate  mg oral tablet, extended release ; Simple Display Line:   See Instructions, TAKE 1 TABLET BY MOUTH EVERY DAY, 90 tab(s), 1 Refill(s) ; Ordering Provider:   Emmy Pa; Catalog Code:   metoprolol ; Order Dt/Tm:   2/18/2020 7:55:26 AM CST

## 2022-02-16 NOTE — NURSING NOTE
Asthma Control Test (ACT) Total Entered On:  2/19/2020 1:48 PM CST    Performed On:  2/19/2020 1:48 PM CST by Nereida Chairez LPN               Asthma Control Test (ACT) Total   Asthma Control Test Total (Adult) :   24    Asthma Action Plan Provided? :   No   Nereida Chairez LPN - 2/19/2020 1:48 PM CST

## 2022-02-16 NOTE — TELEPHONE ENCOUNTER
Entered by Dinora Mckeon CMA on August 12, 2020 8:59:54 AM CDT  ---------------------  From: Dinora Mckeon CMA   To: [facility]/pharmacy #[id number]    Sent: 8/12/2020 8:59:53 AM CDT  Subject: Medication Management     ** Submitted: **  Order:atorvastatin (atorvastatin 20 mg oral tablet)  1 tab(s)  Oral  daily  Qty:  30 tab(s)        Refills:  0          Substitutions Allowed     Route To Pharmacy - [facility]/pharmacy #[id number]    Signed by Dinora Mckeon CMA  8/12/2020 1:59:00 PM [facility]    ** Submitted: **  Complete:atorvastatin (atorvastatin 20 mg oral tablet)   Signed by Dinora Mckeon CMA  8/12/2020 1:59:00 PM [facility]    ** Not Approved:  **  atorvastatin (ATORVASTATIN 20 MG TABLET)  TAKE 1 TABLET BY MOUTH EVERY DAY  Qty:  90 tab(s)        Days Supply:  90        Refills:  1          Substitutions Allowed     Route To Pharmacy - [facility]/pharmacy #[id number]   Signed by Dinora Mckeon CMA            ------------------------------------------  From: [facility] STORE [id number]  To: Emmy Pa  Sent: August 12, 2020 12:23:31 AM CDT  Subject: Medication Management  Due: August 12, 2020 4:17:26 PM CDT     ** On Hold Pending Signature **     Dispensed Drug: atorvastatin (atorvastatin 20 mg oral tablet), TAKE 1 TABLET BY MOUTH EVERY DAY  Quantity: 90 tab(s)  Days Supply: 90  Refills: 1  Substitutions Allowed  Notes from Pharmacy:  ------------------------------------------LDVM for patient at 0900- due for Med check per [facility]. 30 day supply sent to hold patient over. Pt advised to schedule appt.

## 2022-02-16 NOTE — LETTER
(Inserted Image. Unable to display)   August 19, 2020      ZE CASTLE      [address] [place], [place] [id number]        Dear ZE,    Thank you for selecting [facility] for your healthcare needs.  Below you will find the results of the recent tests done at our clinic.      Your lab results show significant improvement which should correlate to reduction of cardiovascular risk, Ze.  Continue your current dose of meds. Thank you.      Result Name Current Result Previous Result Reference Range   Cholesterol (mg/dL) ((H)) 216 8/18/2020 ((H)) 304 2/18/2020  - <200   Non-HDL Cholesterol ((H)) 143 8/18/2020 ((H)) 230 2/18/2020  - <130   HDL (mg/dL)  73 8/18/2020  74 2/18/2020 > OR = 50 -    Cholesterol/HDL Ratio  3.0 8/18/2020  4.1 2/18/2020  - <5.0   LDL ((H)) 105 8/18/2020 ((H)) 188 2/18/2020    Triglyceride (mg/dL) ((H)) 266 8/18/2020 ((H)) 227 2/18/2020  - <150       Please contact me or my assistant at [phone] if you have any questions or concerns.     Sincerely,        CARLOS Bacon  Family Nurse Practitioner      What do your labs mean?  Below is a glossary of commonly ordered labs:  LDL   Bad Cholesterol   HDL   Good Cholesterol  AST/ALT   Liver Function   Cr/Creatinine   Kidney Function  Microalbumin   Kidney Function  BUN   Kidney Function  PSA   Prostate    TSH   Thyroid Hormone  HgbA1c   Diabetes Test   Hgb (Hemoglobin)   Red Blood Cells  WBC   White Blood Cell Count

## 2022-02-16 NOTE — PROGRESS NOTES
Patient:   ZE CASTLE            MRN: [id number]            FIN: [id number]               Age:   61 years     Sex:  Female     :  1955   Associated Diagnoses:   Acute maxillary sinusitis; Eustachian tube dysfunction   Author:   George Mchugh PA-C      Chief Complaint   10/3/2017 9:23 AM CDT    Pt here for lugged ears x 4-5 days.  Pt also c/o nasal drainage and upper tooth pain last week.      History of Present Illness   Chief complaint and symptoms noted above and confirmed with patient   plugged ears and head, no ear pain  has used some mucinex for a day without relief      Review of Systems   Constitutional:  Negative.    Ear/Nose/Mouth/Throat:  Nasal congestion.         Decreased hearing: Bilaterally.    Respiratory:  Negative.       Health Status   Allergies:    Allergic Reactions (Selected)  Severity Not Documented  Mold (No reactions were documented)   Medications:  (Selected)   Prescriptions  Prescribed  Metoprolol Succinate  mg oral tablet, extended release: 1 tab(s) ( 100 mg ), po, daily, # 90 tab(s), 0 Refill(s), Type: Maintenance, Pharmacy: [facility] #[id number], due for fasting labs/annual check up in November.  ProAir HFA 90 mcg/inh inhalation aerosol: See Instructions, Instructions: INHALE 2 PUFFS BY MOUTH EVERY 4 HOURS AS NEEDED FOR SHORTNESS OF BREATH OR WHEEZING, # 3 EA, 1 Refill(s), Type: Maintenance, Pharmacy: [facility] #[id number]  amLODIPine 10 mg oral tablet: 1 tab(s) ( 10 mg ), po, daily, # 90 tab(s), 0 Refill(s), Type: Maintenance, Pharmacy: [facility] #[id number], pt due for fasting labs/annual check up in November.  hydroCHLOROthiazide 25 mg oral tablet: 1 tab(s) ( 25 mg ), po, daily, # 90 tab(s), 0 Refill(s), Type: Maintenance, Pharmacy: [facility]/pharmacy #[id number], Due for annual check up and fasting labs in November  levothyroxine 125 mcg (0.125 mg) oral tablet: 1 tab(s) ( 125 mcg ), po, daily, # 90 tab(s), 3 Refill(s), Type: Maintenance, Pharmacy: [facility]/pharmacy #[id number], 1 tab(s) po  daily  losartan 100 mg oral tablet: 1 tab(s) ( 100 mg ), po, daily, # 90 tab(s), 0 Refill(s), Type: Maintenance, Pharmacy: [facility]/pharmacy #[id number], Pt due for fasting labs and annual check in November   Problem list:    All Problems  Hypothyroidism / SNOMED CT 533076323 / Confirmed  Hypertension / SNOMED CT 0920399352 / Confirmed  Obese / ICD-9-.00 / Probable  Aortic valve calcification / SNOMED CT 813712948 / Confirmed  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 / Confirmed  Seasonal Allergies / ICD-9-.9 / Confirmed  Asthma, Mild Intermittent / ICD-9-.90 / Confirmed  Osteopenia / ICD-9-.90 / Confirmed  Ex-Smoker / ICD-9-CM V15.82 / Confirmed  Inactive: Irritant contact dermatitis versus autoimmune process  Inactive: Has received third dose of hepatitis B vaccine / SNOMED CT 8767987905  Inactive: Eczema / ICD-9-.9  Inactive: Finger mass, left / ICD-9-.2  Resolved: Mild persistent asthma / SNOMED CT 8791859731  Resolved: Rash, Skin / ICD-9-.1  Canceled: Asthma  Canceled: Osteopenia  Canceled: Eczema  Canceled: Eczema      Histories   Past Medical History:    Active  Keratoacanthoma (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  Comments:  2/22/2011 CST 11:38 AM Gilda Cox  Not definitive diagnosis, suspected, right arm.  Hypertension (7076181034)  Asthma, Mild Intermittent (493.90)  Ex-Smoker (V15.82)  Hypothyroidism (987521059)  Comments:  2/22/2011 CST 10:38 AM Gilda Cox  Currently euthyroid  Seasonal Allergies (477.9)  Osteopenia (733.90)  Aortic valve calcification (831162265)  Resolved  Rash, Skin (782.1): Onset in the month of 4/2005 at 49 years  Resolved.  Mild persistent asthma (2592750285):  Resolved.   Family History:    Hypothyroid  Sister  Hypertension  Mother  Brother  Osteoporosis  Mother  Grandmother (M)     Procedure history:    Repair of shoulder (704007368) in 1991 at 36 Years.  Comments:  2/22/2011 11:51 AM Gilda Campbell  Repair of left shoulder  dislocation.  Childbirth (8047107854) in 1976 at 21 Years.  Comments:  2011 11:46 AM - Gilda Arango.  Toxemia at very end.  Tubal ligation (630737096).  Cardiac computed tomography for calcium scoring (4503012260).  Comments:  9/10/2014 9:51 AM - Jessica ROSARIOCinthya  Done 2014 - results Zero  Do an Echo in 1-2 years because of mild degeneration in the aortic valve   Social History:        Alcohol Assessment: Current            Wine, 1-2 times per week      Tobacco Assessment: Past      Substance Abuse Assessment            Never      Exercise and Physical Activity Assessment: Regular exercise      Other Assessment            Immunization:, Per paper chart patient completed Hepatitis B series in . No dates. Not listed on WIR.            Marital Status,         Physical Examination   Vital Signs   10/3/2017 9:23 AM CDT Temperature Tympanic 97.6 DegF  LOW    Peripheral Pulse Rate 60 bpm    Pulse Site Radial artery    Respiratory Rate 20 br/min    Systolic Blood Pressure 152 mmHg  HI    Diastolic Blood Pressure 84 mmHg    Mean Arterial Pressure 107 mmHg    BP Site Right arm      Measurements from flowsheet : Measurements   10/3/2017 9:23 AM CDT Height Measured - Standard 62.25 in    Ht/Wt Measurement Refused by Patient? Yes      General:  No acute distress.    HENT:  Tympanic membranes are clear, No pharyngeal erythema, nares are patent, but nasal turbinates are inflamed and narrowed , left maxillary sinus tenderness, cannot do Valsalva manuever to get ears to pop.    Neck:  Supple, Non-tender, No lymphadenopathy.    Respiratory:  Lungs are clear to auscultation.       Impression and Plan   Diagnosis     Acute maxillary sinusitis (OBC15-JR J01.00).     Eustachian tube dysfunction (CVV72-ZN H69.80).     Patient Instructions:   Encouraged to use heat over the sinuses, salt water nasal spray, decongestants and expectorants, NSAIDs.  Follow up if not improving.    Summary:  will treat with amoxicillin,  atrovent nasal spray.    Orders     Orders   Pharmacy:  Atrovent 42 mcg/inh nasal spray (Prescribe): 2 spray(s), nasal, qid, PRN: for nasal congestion, # 1 EA, 2 Refill(s), Type: Maintenance, Pharmacy: [facility] [id number], 2 spray(s) nasal qid,PRN:for nasal congestion  amoxicillin 875 mg oral tablet (Prescribe): 1 tab(s) ( 875 mg ), PO, BID, x 10 day(s), # 20 tab(s), 0 Refill(s), Type: Maintenance, Pharmacy: [facility] [id number], 1 tab(s) po bid,x10 day(s).     Orders   Charges (Evaluation and Management):  74187 office outpatient visit 15 minutes (Charge) (Order): Quantity: 1, Acute maxillary sinusitis  Eustachian tube dysfunction.

## 2022-02-16 NOTE — TELEPHONE ENCOUNTER
Entered by Anabel Darby on September 01, 2020 9:05:12 AM CDT  ---------------------  From: Anabel Darby   To: [facility]/pharmacy #[id number]    Sent: 9/1/2020 9:05:12 AM CDT  Subject: Medication Management     ** Submitted: **  Order:metoprolol (Metoprolol Succinate  mg oral tablet, extended release)  1 tab(s)  Oral  daily  Qty:  90 tab(s)        Refills:  3          Substitutions Allowed     Route To Pharmacy - [facility]/pharmacy #[id number]    Signed by Anabel Darby  9/1/2020 2:04:00 PM [facility]    ** Submitted: **  Complete:metoprolol (Metoprolol Succinate  mg oral tablet, extended release)   Signed by Anabel Darby  9/1/2020 2:05:00 PM [facility]    ** Not Approved:  **  metoprolol (METOPROLOL SUCC  MG TAB)  TAKE 1 TABLET BY MOUTH EVERY DAY  Qty:  90 tab(s)        Days Supply:  90        Refills:  0          Substitutions Allowed     Route To Pharmacy - [facility]/pharmacy #[id number]   Signed by Anabel Darby          Med Refill      duplicate request.  Med filled for 1 year on 8/18/20 by [facility].  Will resend.       ** Patient matched by Anabel Darby on 9/1/2020 9:04:04 AM CDT **      ------------------------------------------  From: [facility] STORE [id number]  To: Emmy Pa  Sent: August 31, 2020 12:26:20 AM CDT  Subject: Medication Management  Due: August 28, 2020 8:16:52 PM CDT     ** On Hold Pending Signature **     Dispensed Drug: metoprolol (Metoprolol Succinate  mg oral tablet, extended release), TAKE 1 TABLET BY MOUTH EVERY DAY  Quantity: 90 tab(s)  Days Supply: 90  Refills: 0  Substitutions Allowed  Notes from Pharmacy:  ------------------------------------------

## 2022-02-16 NOTE — TELEPHONE ENCOUNTER
Entered by Bear Russo LPN on April 05, 2021 12:34:56 PM CDT  ---------------------  From: Bear Russo LPN   [id number]    Sent: 4/5/2021 12:34:56 PM CDT  Subject: Medication Management     ** Submitted: **  Complete:levothyroxine (levothyroxine 125 mcg (0.125 mg) oral tablet)   Signed by Bear Russo LPN  4/5/2021 5:34:00 PM [facility]    ** Submitted: **  Complete:valsartan (valsartan 80 mg oral tablet)   Signed by Bear Russo LPN  4/5/2021 5:34:00 PM [facility]    ** Approved with modifications: **  levothyroxine (LEVOTHYROXINE 125 MCG TABLET)  TAKE 1 TABLET BY MOUTH EVERY DAY  Qty:  30 tab(s)        Days Supply:  30        Refills:  0          Substitutions Allowed     Route To Pharmacy - [facility] #[id number]   Signed by Bear Russo LPN    ** Approved with modifications: **  valsartan (VALSARTAN 80 MG TABLET)  TAKE 1 TABLET BY MOUTH EVERY DAY  Qty:  30 tab(s)        Days Supply:  30        Refills:  0          Substitutions Allowed     Route To Pharmacy - [facility] #[id number]   Signed by Bear Russo LPN            ** Patient matched by Bear Russo LPN on 4/5/2021 12:31:36 PM CDT **      ------------------------------------------  From: [facility] STORE [id number]  To: Emmy Pa  Sent: April 3, 2021 1:34:48 PM CDT  Subject: Medication Management  Due: March 24, 2021 8:29:58 PM CDT     ** On Hold Pending Signature **     Dispensed Drug: levothyroxine (levothyroxine 125 mcg (0.125 mg) oral tablet), TAKE 1 TABLET BY MOUTH EVERY DAY  Quantity: 30 tab(s)  Days Supply: 30  Refills: 0  Substitutions Allowed  Notes from Pharmacy:     ** On Hold Pending Signature **     Dispensed Drug: valsartan (valsartan 80 mg oral tablet), TAKE 1 TABLET BY MOUTH EVERY DAY  Quantity: 30 tab(s)  Days Supply: 30  Refills: 0  Substitutions Allowed  Notes from Pharmacy:  ------------------------------------------Medication Refill    PCP:   _ ANAI    Name of med requested:  _ valsartan, levothyroxine    Date of last  office visit and reason:  _ 8-18-20      Date of last Med Check / Px:   _ 8-18-20    Date of last labs pertaining to med:  _ 8-18-20    RTC order in chart:  _ Yes    For Protocol refill, has patient been contacted:  _ Yes

## 2022-02-16 NOTE — TELEPHONE ENCOUNTER
---------------------  From: April Lunsford CMA (eRx Pool (32224_[place]))   To: Appointment Pool (32224_[place]);     Sent: 3/2/2021 2:12:38 PM CST  Subject: overdue appt     Please contact pt to schedule HTN med check with NC[facility]ALLED PATIENT/LVM TO SCHEDULELVM x2

## 2022-02-16 NOTE — TELEPHONE ENCOUNTER
Entered by April Lunsford CMA on December 30, 2019 9:11:46 AM CST  ---------------------  From: April Lunsford CMA   [id number]    Sent: 12/30/2019 9:11:46 AM CST  Subject: Medication Management     ** Submitted: **  Order:metoprolol (Metoprolol Succinate  mg oral tablet, extended release)  1 tab(s)  Oral  daily  Qty:  30 tab(s)        Refills:  0          KLARISSA     Route To Pharmacy - [facility]/pharmacy #[id number]    Signed by April Lunsford CMA  12/30/2019 9:11:00 AM    ** Submitted: **  Complete:metoprolol (Metoprolol Succinate  mg oral tablet, extended release)   Signed by April Lunsford CMA  12/30/2019 9:11:00 AM    ** Not Approved:  **  metoprolol (METOPROLOL SUCC  MG TAB)  TAKE 1 TABLET BY MOUTH EVERY DAY  Qty:  90 tab(s)        Days Supply:  90        Refills:  3          KLARISSA     [id number]   Signed by April Lunsford CMA            ------------------------------------------  From: [facility]/pharmacy #[id number]  To: Emmy Pa  Sent: December 30, 2019 2:27:27 AM CST  Subject: Medication Management  Due: December 31, 2019 2:27:27 AM CST    ** On Hold Pending Signature **  Drug: metoprolol (Metoprolol Succinate  mg oral tablet, extended release)  TAKE 1 TABLET BY MOUTH EVERY DAY  Quantity: 90 tab(s)  Days Supply: 90  Refills: 3  KLARISSA  Notes from Pharmacy:     Dispensed Drug: metoprolol (Metoprolol Succinate  mg oral tablet, extended release)  TAKE 1 TABLET BY MOUTH EVERY DAY  Quantity: 90 tab(s)  Days Supply: 90  Refills: 3  KLARISSA  Notes from Pharmacy:   ------------------------------------------Med Refill      Date of last office visit and reason:  12/28/18; med check      Date of last Med Check / Px:   12/28/18  Date of last labs pertaining to med:  12/19/18    RTC order in chart:  yes; due now    For Protocol refill, has patient been contacted:  msg sent to pharmacy

## 2022-02-16 NOTE — LETTER
(Inserted Image. Unable to display)     April 01, 2019      ZE CASTLE  [address] [place] [id number]          Dear ZE,      Thank you for selecting [facility] for your healthcare needs.      Our records indicate you are due for the following services:     Non-Fasting Labs.    If you had your labs done at another facility or with Direct Access Lab Testing at [facility], please bring in a copy of the results to your next visit, mail a copy, or drop off a copy of your results to your Healthcare Provider.      To schedule an appointment or if you have further questions, please contact your primary clinic:   [facility]       [phone]   [facility]       [phone]              [facility]     [phone]      Powered by [employer]    Sincerely,    JARETT WhitakerNP

## 2022-02-16 NOTE — NURSING NOTE
Asthma Assessment Entered On:  4/15/2021 10:35 AM CDT    Performed On:  4/15/2021 10:35 AM CDT by Emmy Pa               History   Asthma Severity :   Intermittent   Emmy Pa - 4/15/2021 10:35 AM CDT

## 2022-02-16 NOTE — TELEPHONE ENCOUNTER
---------------------  From: Fredy ROSARIOAnalisa (eRx Pool (32224_[facility]))   To: [facility] Message Pool (32224_[facility]);     Sent: 8/17/2020 11:17:14 AM CDT  Subject: FW: Medication Management   Due Date/Time: 8/14/2020 8:26:00 AM CDT     Appt w/ [facility] 8/18. See pharmacy note regarding quantity      ** Patient matched by Analisa Daniel CMA on 8/17/2020 11:15:49 AM CDT **        From: [facility] STORE [id number]  To: Emmy Pa  Sent: August 13, 2020 8:26:16 AM CDT  Subject: Medication Management  Due: August 14, 2020 8:26:16 AM CDT     Originally Prescribed Drug:  Drug: valsartan (valsartan 40 mg oral tablet), TAKE 2 TABLETS BY MOUTH EVERY DAY  Quantity: 180 tab(s)  Days Supply: 30  Refills: 0  Substitutions Allowed  Notes from Pharmacy: Alternative Requested:INSURANCE ONLY ALLOWS 90 DAY SUPPLY. LESS THAN 90 DAY SUPPLY REMAINS ON RX. PLEASE SEND NEW RX FOR . THANKS!     ** On Hold Pending Signature **  Preferred Alternative Drug: valsartan (valsartan 40 mg oral tablet), TAKE 2 TABLETS BY MOUTH EVERY DAY  Quantity: 180 tab(s)  Days Supply: 30  Refills: 0  Substitutions Allowed  Notes from Pharmacy: Alternative Requested:INSURANCE ONLY ALLOWS 90 DAY SUPPLY. LESS THAN 90 DAY SUPPLY REMAINS ON RX. PLEASE SEND NEW RX FOR . THANKS!---------------------  From: Nereida Chairez LPN ([email or web address] Message Pool (32224_[facility]))   To: Emmy Pa;     Sent: 8/19/2020 3:47:45 PM CDT  Subject: FW: Medication Management   Due Date/Time: 8/14/2020 8:26:00 AM CDT---------------------  From: Emmy Pa   To: [facility]/pharmacy #[id number]    Sent: 8/19/2020 3:50:54 PM CDT  Subject: FW: Medication Management     ** Approved **  Order:valsartan (valsartan 40 mg oral tablet)  TAKE 2 TABLETS BY MOUTH EVERY DAY  Qty:  180 tab(s)        Days Supply:  30        Refills:  0          Substitutions Allowed     Route To Pharmacy - [facility] STORE [facility]122   Note from Pharmacy:  Alternative Requested:INSURANCE ONLY ALLOWS 90 DAY  SUPPLY. LESS THAN 90 DAY SUPPLY REMAINS ON RX. PLEASE SEND NEW RX FOR . THANKS!  Signed by Emmy Pa

## 2022-02-16 NOTE — NURSING NOTE
Quick Intake Entered On:  2/18/2020 9:18 AM CST    Performed On:  2/18/2020 9:18 AM CST by Emmy Pa               Summary   Systolic Blood Pressure :   138 mmHg (HI)    Diastolic Blood Pressure :   80 mmHg   Mean Arterial Pressure :   99 mmHg   Emmy Pa - 2/18/2020 9:18 AM CST

## 2022-02-16 NOTE — TELEPHONE ENCOUNTER
Entered by Emmy Pa on February 18, 2020 7:55:29 AM CST  ---------------------  From: Emmy Pa   To: [facility]/pharmacy #[id number]    Sent: 2/18/2020 7:55:29 AM CST  Subject: Medication Management     ** Approved **  Order:metoprolol (Metoprolol Succinate  mg oral tablet, extended release)  TAKE 1 TABLET BY MOUTH EVERY DAY  Qty:  90 tab(s)        Days Supply:  90        Refills:  1          KLARISSA     Route To Pharmacy - [facility] STORE [address]60   Note from Pharmacy:  REQUEST FOR 90 DAYS PRESCRIPTION.  Note to Pharmacy:  30 days of the alternative is fine. She is due for an appoint  Signed by Emmy Pa    ** Cancelled: **  Discontinue:metoprolol (Metoprolol Succinate  mg oral tablet, extended release)  Signed by Emmy Pa            From: [facility] STORE [address]3300  To: Emmy Pa  Sent: February 18, 2020 4:00:44 AM CST  Subject: Medication Management  Due: February 19, 2020 4:00:44 AM CST     Originally Prescribed Drug:  Drug: metoprolol (Metoprolol Succinate  mg oral tablet, extended release), TAKE 1 TABLET BY MOUTH EVERY DAY  Quantity: 30 tab(s)  Days Supply: 30  Refills: 0  KLARISSA  Notes from Pharmacy: REQUEST FOR 90 DAYS PRESCRIPTION.     ** On Hold Pending Signature **  Preferred Alternative Drug: metoprolol (Metoprolol Succinate  mg oral tablet, extended release), TAKE 1 TABLET BY MOUTH EVERY DAY  Quantity: 90 tab(s)  Days Supply: 90  Refills: 1  KLARISSA  Notes from Pharmacy: REQUEST FOR 90 DAYS PRESCRIPTION.

## 2022-02-16 NOTE — LETTER
(Inserted Image. Unable to display)   December 31, 2019        ZE CASTLE  [address] [place] [id number]        Dear ZE,      Thank you for selecting [facility] for your healthcare needs.    Our records indicate you are due for the following services:     Annual Physical & Fasting Lab Tests ~ Please do not eat or drink anything 10 hours prior to your scheduled appointment time.  (Water and any medications that you may need are allowed unless directed otherwise.)    If you had your labs done at another facility or with Direct Access Lab Testing at [facility], please bring in a copy of the results to your next visit, mail a copy, or drop off a copy of your results to your Healthcare Provider.    To schedule an appointment or if you have further questions, please contact your primary clinic:   [facility]       [phone]   [facility]       [phone]              [facility]     [phone]      Powered by [employer]    Sincerely,    CARLOS Whitaker

## 2022-02-16 NOTE — TELEPHONE ENCOUNTER
Entered by April Lunsford CMA on March 18, 2021 7:18:19 AM CDT  ---------------------  From: April Lunsford CMA   To: [facility]/pharmacy #[id number]    Sent: 3/18/2021 7:18:19 AM CDT  Subject: Medication Management     ** Rx Change Denied: Change not appropriate **  (AMLODIPINE BESYLATE 10 MG TAB)   TAKE 1 TABLET BY MOUTH EVERY DAY  Qty:  30 tab(s)        Days Supply:  30        Refills:  0          Substitutions Allowed     Route To Pharmacy - [facility]/pharmacy #[id number]   Note from Pharmacy:  REQUEST FOR 90 DAYS PRESCRIPTION.  Signed by April Lunsford CMA            From: [facility] STORE [address]202  To: Emmy Pa  Sent: March 16, 2021 2:56:31 PM CDT  Subject: Medication Management  Due: March 17, 2021 2:56:31 PM CDT     Originally Prescribed Drug:  Drug: amLODIPine (amLODIPine 10 mg oral tablet), TAKE 1 TABLET BY MOUTH EVERY DAY  Quantity: 30 tab(s)  Days Supply: 30  Refills: 0  Substitutions Allowed  Notes from Pharmacy: REQUEST FOR 90 DAYS PRESCRIPTION.     ** On Hold Pending Signature **  Preferred Alternative Drug: amLODIPine (amLODIPine 10 mg oral tablet), TAKE 1 TABLET BY MOUTH EVERY DAY  Quantity: 90 tab(s)  Days Supply: 90  Refills: 1  Substitutions Allowed  Notes from Pharmacy: REQUEST FOR 90 DAYS PRESCRIPTION.

## 2022-02-16 NOTE — LETTER
(Inserted Image. Unable to display)   February 19, 2020      ZE CASTLE      [address] [place] [address] [id number]        Dear ZE,    Thank you for selecting [facility] for your healthcare needs.  Below you will find the results of the recent tests done at our clinic.      Here are the results we discussed on the phone, Ze.  Your cholesterol numbers are climbing and the fact you are a former smoker and have high blood pressure makes you more at risk for stroke or heart attack or other vascular problems. Start the statin medication as we discussed and when you return from your trip, call to set up an echo cardiogram to look at your heart valves again. Thanks so much.      Result Name Current Result Previous Result Reference Range   Sodium Level (mmol/L)  139 2/18/2020  138 12/19/2018 135 - 146   Potassium Level (mmol/L)  4.0 2/18/2020 ((L)) 3.0 12/19/2018 3.5 - 5.3   Chloride Level (mmol/L)  100 2/18/2020  99 12/19/2018 98 - 110   CO2 Level (mmol/L)  28 2/18/2020  27 12/19/2018 20 - 32   Glucose Level (mg/dL) ((H)) 100 2/18/2020  90 12/19/2018 65 - 99   BUN (mg/dL)  12 2/18/2020  25 12/19/2018 7 - 25   Creatinine Level (mg/dL)  0.92 2/18/2020 ((H)) 1.01 12/19/2018 0.50 - 0.99   BUN/Creat Ratio  NOT APPLICABLE 2/18/2020 ((H)) 25 12/19/2018 6 - 22   eGFR (mL/min/1.73m2)  66 2/18/2020 ((L)) 59 12/19/2018 > OR = 60 -    eGFR  (mL/min/1.73m2)  76 2/18/2020  69 12/19/2018 > OR = 60 -    Calcium Level (mg/dL)  9.8 2/18/2020  9.5 12/19/2018 8.6 - 10.4   Cholesterol (mg/dL) ((H)) 304 2/18/2020 ((H)) 299 12/19/2018  - <200   Non-HDL Cholesterol ((H)) 230 2/18/2020 ((H)) 228 12/19/2018  - <130   HDL (mg/dL)  74 2/18/2020  71 12/19/2018 > OR = 50 -    Cholesterol/HDL Ratio  4.1 2/18/2020  4.2 12/19/2018  - <5.0   LDL ((H)) 188 2/18/2020 ((H)) 192 12/19/2018    Triglyceride (mg/dL) ((H)) 227 2/18/2020 ((H)) 183 12/19/2018  - <150   TSH (mIU/L)  0.82 2/18/2020  2.97 12/19/2018 0.40 -  4.50       Please contact me or my assistant at [phone] if you have any questions or concerns.     Sincerely,        NANDINI Bacon-NP  Family Nurse Practitioner      What do your labs mean?  Below is a glossary of commonly ordered labs:  LDL   Bad Cholesterol   HDL   Good Cholesterol  AST/ALT   Liver Function   Cr/Creatinine   Kidney Function  Microalbumin   Kidney Function  BUN   Kidney Function  PSA   Prostate    TSH   Thyroid Hormone  HgbA1c   Diabetes Test   Hgb (Hemoglobin)   Red Blood Cells  WBC   White Blood Cell Count

## 2022-02-16 NOTE — PROGRESS NOTES
Patient:   ZE CASTLE            MRN: [id number]            FIN: [id number]               Age:   63 years     Sex:  Female     :  1955   Associated Diagnoses:   Low blood potassium; Hypothyroidism; Hypertension; Hyperlipidemia; Elevated creatine kinase; Asthma, mild intermittent   Author:   Emmy Pa      Chief Complaint   2018 9:21 AM CST   Annual medication check        History of Present Illness   SHe is here for med refills:  1.  Asthma--has tried steroid inhalers but has side effects, is happy to use albuterol prn, states she rarely is SOB, see ACT and AAP.  2.   HTN--on 4 controller meds, well controlled, numbers at home 130-140 systolic. Her potassium has fallen sig since last BNP, she is cutting carbs (no potatoes) and does not like bananas. Will start potassium supplement daily and increase dietary consumption carbs  3.  Elevated lipids. She is NOT interested in statins, she is focusing on dietary changes  4. thyroid--well controlled      Health Status   Allergies:    Allergic Reactions (Selected)  Severity Not Documented  Mold (No reactions were documented)   Medications:  (Selected)   Prescriptions  Prescribed  Metoprolol Succinate  mg oral tablet, extended release: = 1 tab(s), Oral, daily, # 90 tab(s), 3 Refill(s), KLARISSA, Type: Maintenance, Pharmacy: [facility]/pharmacy #[id number], 1 tab(s) Oral daily  ProAir HFA 90 mcg/inh inhalation aerosol: See Instructions, Instructions: INHALE 2 PUFFS BY MOUTH EVERY 4 HOURS AS NEEDED FOR SHORTNESS OF BREATH OR WHEEZING, # 3 EA, 3 Refill(s), Type: Soft Stop, Pharmacy: [facility]/pharmacy #[id number], INHALE 2 PUFFS BY MOUTH EVERY 4 HOURS AS NEEDED FOR SHORTNESS OF...  amLODIPine 10 mg oral tablet: = 1 tab(s) ( 10 mg ), Oral, daily, # 90 tab(s), 3 Refill(s), Type: Maintenance, Pharmacy: [facility]/pharmacy #[id number], 1 tab(s) Oral daily  hydroCHLOROthiazide 25 mg oral tablet: = 1 tab(s) ( 25 mg ), Oral, daily, # 90 tab(s), 3 Refill(s), Type: Maintenance, Pharmacy:  [facility]/pharmacy #[id number], 1 tab(s) Oral daily  levothyroxine 125 mcg (0.125 mg) oral tablet: = 1 tab(s), Oral, daily, # 90 tab(s), 3 Refill(s), Type: Maintenance, Pharmacy: [facility]/pharmacy #[id number], 1 tab(s) Oral daily  losartan 100 mg oral tablet: = 1 tab(s) ( 100 mg ), po, daily, # 90 tab(s), 3 Refill(s), Type: Maintenance, Pharmacy: [facility]/pharmacy #[id number], 1 tab(s) Oral daily  potassium chloride 10 mEq oral capsule, extended release: = 1 cap(s) ( 10 mEq ), PO, daily, # 90 cap(s), 0 Refill(s), Type: Maintenance, [id number], 1 cap(s) Oral daily,x90 day(s)   Problem list:    All Problems  Hypertension / SNOMED CT 6306034476 / Confirmed  Asthma, mild intermittent / SNOMED CT 2124476248 / Confirmed  Ex-smoker / SNOMED CT 14923109 / Confirmed  Hypothyroidism / SNOMED CT 694184216 / Confirmed  Currently euthyroid  Seasonal allergies / SNOMED CT 217700022 / Confirmed  Keratoacanthoma / SNOMED CT 578329615 / Confirmed  Not definitive diagnosis, suspected, right arm.  Osteopenia / SNOMED CT 953117650 / Confirmed  Obese / SNOMED CT 0186920641 / Probable  Aortic valve calcification / SNOMED CT 668880760 / Confirmed  Inactive: Irritant contact dermatitis versus autoimmune process  Dermatology consult on 05/10/2005  Inactive: Finger mass, left / SNOMED CT 933700335  Inactive: Eczema / SNOMED CT 53629740  Inactive: Has received third dose of hepatitis B vaccine / SNOMED CT 1448134454  Resolved: Rash, skin / SNOMED CT 401522994  Resolved: Mild persistent asthma / SNOMED CT 2772701168  Canceled: Asthma  Childhood  Canceled: Osteopenia  Canceled: Eczema  Canceled: Eczema      Histories   Past Medical History:    Active  Keratoacanthoma (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  Comments:  2/22/2011 CST 11:38 AM CST - Gilda Arango  Not definitive diagnosis, suspected, right arm.  Hypertension (3717055252)  Asthma, mild intermittent (7035989339)  Ex-smoker (88738602)  Hypothyroidism (476362934)  Comments:  2/22/2011 CST 10:38 AM  AMANDA Arango Gilda  Currently euthyroid  Seasonal allergies (599133438)  Osteopenia (437742929)  Obese (9230441771)  Aortic valve calcification (357467167)  Resolved  Rash, skin (289236739): Onset in the month of 2005 at 49 years  Resolved.  Mild persistent asthma (8340399814):  Resolved.   Family History:    Hypothyroid  Sister  Hypertension  Mother  Brother  Osteoporosis  Mother  Grandmother (M)     Procedure history:    Repair of shoulder (SNOMED CT 855086906) in  at 36 Years.  Comments:  2011 11:51 AM Gilda Cox  Repair of left shoulder dislocation.  Childbirth (SNOMED CT 4642083362) in  at 21 Years.  Comments:  2011 11:46 AM Gilda Cox  .  Toxemia at very end.  Tubal ligation (SNOMED CT 325167968).  Cardiac computed tomography for calcium scoring (SNOMED CT 3948007985).  Comments:  9/10/2014 9:51 AM CDT - Cinthya Lopez CMA  Done 2014 - results Zero  Do an Echo in 1-2 years because of mild degeneration in the aortic valve      Physical Examination   Vital Signs   2018 9:46 AM CST Systolic Blood Pressure 128 mmHg    Diastolic Blood Pressure 72 mmHg    Mean Arterial Pressure 91 mmHg   2018 9:21 AM CST Temperature Tympanic 98.6 DegF    Peripheral Pulse Rate 70 bpm    Pulse Site Radial artery    HR Method Manual    Systolic Blood Pressure 144 mmHg  HI    Diastolic Blood Pressure 72 mmHg    Mean Arterial Pressure 96 mmHg    BP Site Right arm    BP Method Manual      Measurements from flowsheet : Measurements   2018 9:21 AM CST Height Measured - Standard 62.25 in    Weight Measured - Standard 169 lb    BSA 1.83 m2    Body Mass Index 30.66 kg/m2  HI      see copy of ACT   General:  Alert and oriented, No acute distress.    Eye:  Pupils are equal, round and reactive to light, Extraocular movements are intact, Normal conjunctiva.    HENT:  Normocephalic, Tympanic membranes are clear, Oral mucosa is moist, No pharyngeal erythema.    Neck:  Supple, Non-tender.     Respiratory:  Lungs are clear to auscultation, Respirations are non-labored, Breath sounds are equal.    Cardiovascular:  Normal rate, Regular rhythm, No edema.    Integumentary:  Warm, Pink, Moist, No rash.    Neurologic:  Alert, Oriented.       Review / Management   Results review:  Lab results   12/19/2018 8:27 AM CST Sodium Level 138 mmol/L    Potassium Level 3.0 mmol/L  LOW    Chloride Level 99 mmol/L    CO2 Level 27 mmol/L    Glucose Level 90 mg/dL    BUN 25 mg/dL    Creatinine Level 1.01 mg/dL  HI    BUN/Creat Ratio 25  HI    eGFR 59 mL/min/1.73m2  LOW    eGFR  69 mL/min/1.73m2    Calcium Level 9.5 mg/dL    Cholesterol 299 mg/dL  HI    Non-HDL Cholesterol 228  HI    HDL 71 mg/dL    Cholesterol/HDL Ratio 4.2      HI    Triglyceride 183 mg/dL  HI    TSH 2.97 mIU/L   .       Impression and Plan   Diagnosis     Low blood potassium (EUS93-LX E87.6).     Hypothyroidism (MGZ63-CD E06.3).     Hypertension (VBZ50-EH I10).     Hyperlipidemia (RNG96-SJ E78.5).     Elevated creatine kinase (EYH88-UO R74.8).     Asthma, mild intermittent (AXK36-HK J45.20).     Plan:  Asthma action plan.    Patient Instructions:       Counseled: Patient, Regarding diagnosis, Regarding treatment, Regarding medications, Verbalized understanding, See Asthma Action Plan--reviewed in detail with patient, ACT score reviewed, labs in 3 months to recheck BMP  bp stable  increase potassium in diet/supplement  see AAP and ACT--prefers NO controller med.

## 2022-02-16 NOTE — TELEPHONE ENCOUNTER
---------------------  From: Brittany Arciniega LPN (Phone Messages Pool ([id number]_[facility]))   To: ANAI Message Pool ([id number]_[facility]);     Sent: 3/5/2021 2:23:08 PM CST  Subject: General Message     FYI    Phone Message    PCP:   ANAI      Time of Call:  1:16pm       Person Calling:  pt  Phone number:  [phone]    Returned call at: _    Note:   Pt LM stating she knows she needs to schedule a follow up. Pt says she is in [place] until mid April and will be scheduling appt right after she returns. Pt says if she needs refills she will be contacting her pharmacy to get her by until she can get back for appt.    Last office visit and reason:  8/18/20 chronic diseaseNoted.

## 2022-02-16 NOTE — TELEPHONE ENCOUNTER
Entered by April Lunsford CMA on January 24, 2020 2:00:55 PM CST  ---------------------  From: April Lunsford CMA   [id number]    Sent: 1/24/2020 2:00:55 PM CST  Subject: Medication Management     ** Submitted: **  Order:metoprolol (Metoprolol Succinate  mg oral tablet, extended release)  1 tab(s)  Oral  daily  Qty:  30 tab(s)        Days Supply:  30        Refills:  0          KLARISSA     Route To Pharmacy - [facility]/pharmacy #[id number]    Signed by April Lunsford CMA  1/24/2020 2:00:00 PM    ** Submitted: **  Complete:metoprolol (Metoprolol Succinate  mg oral tablet, extended release)   Signed by April Lunsford CMA  1/24/2020 2:00:00 PM    ** Not Approved:  **  metoprolol (METOPROLOL SUCC  MG TAB)  TAKE 1 TABLET BY MOUTH EVERY DAY  Qty:  30 tab(s)        Days Supply:  30        Refills:  0          KLARISSA     Route To Pharmacy - [facility]/pharmacy #[id number]   Signed by April Lunsford CMA            ------------------------------------------  From: [facility]/pharmacy #[id number]  To: Emmy Pa  Sent: January 24, 2020 12:31:29 PM CST  Subject: Medication Management  Due: January 25, 2020 12:31:29 PM CST    ** On Hold Pending Signature **  Drug: metoprolol (Metoprolol Succinate  mg oral tablet, extended release)  TAKE 1 TABLET BY MOUTH EVERY DAY  Quantity: 30 tab(s)  Days Supply: 30  Refills: 0  KLARISSA  Notes from Pharmacy:     Dispensed Drug: metoprolol (Metoprolol Succinate  mg oral tablet, extended release)  TAKE 1 TABLET BY MOUTH EVERY DAY  Quantity: 30 tab(s)  Days Supply: 30  Refills: 0  KLARISSA  Notes from Pharmacy:   ------------------------------------------Med Refill      Date of last office visit and reason:  12/28/18; med check      Date of last Med Check / Px:   12/28/18  Date of last labs pertaining to med:  12/19/18    RTC order in chart:  yes; due now    For Protocol refill, has patient been contacted:  2/17/20 appt scheduled

## 2022-02-16 NOTE — PROGRESS NOTES
Patient:   ZE CASTLE            MRN: [id number]            FIN: [id number]               Age:   65 years     Sex:  Female     :  1955   Associated Diagnoses:   Hypertension; Grieving   Author:   Emmy Pa      Visit Information      Date of Service: 11/10/2021 11:52 am  Performing Location: [facility]  Encounter#: [id number]      Primary Care Provider (PCP):  [id number] -UNKNOWN,    NPI# [id number]      Referring Provider:  Emmy Pa    NPI# [id number]      Chief Complaint   11/10/2021 12:05 PM CST  here for chronic disease visit, needing to get refills, would like to get her flu shot as well        History of Present Illness   She needs HTN meds refilled  Her   fairly unexpected this summer, she is still grieving but coping  BPs at home 135-140 systolic, she does drink tea throughout the day and is willing to cut back on the caffeine.  feels well otherwise, would like to set up mammo and cologuard and would like flu shot      Health Status   Allergies:    Allergic Reactions (Selected)  Severity Not Documented  Mold (No reactions were documented)   Medications:  (Selected)   Prescriptions  Prescribed  Metoprolol Succinate  mg oral tablet, extended release: = 1 tab(s) ( 100 mg ), Oral, daily, # 90 tab(s), 3 Refill(s), Type: Maintenance, Pharmacy: [place], [place], 1 tab(s) Oral daily, 62.25, in, 11/10/21 12:05:00 CST, Height Measured, 162.9, lb, 11/10/21 12:05:00 CST, Weight Measured  albuterol 90 mcg/inh inhalation aerosol: 2 puff(s), NEB, q4 hrs, PRN: AS NEEDED FOR SHORTNESS OF BREATH OR WHEEZING, # 1 EA, 2 Refill(s), Type: Maintenance, Pharmacy: [place], [place], 2 puff(s) NEB q4 hrs,PRN:AS NEEDED FOR SHORTNESS OF BREATH OR WHEEZING, 62.25, in, 04/15/...  amLODIPine 10 mg oral tablet: = 1 tab(s), Oral, daily, # 90 tab(s), 1 Refill(s), Type: Maintenance, [facility] [place], 1 tab(s) Oral daily,  62.25, in, 11/10/21 12:05:00 CST, Height Measured, 162.9, lb, 11/10/21 12:05:00 CST, Weight Measured  atorvastatin 20 mg oral tablet: = 1 tab(s), Oral, daily, # 90 tab(s), 3 Refill(s), Type: Maintenance, Pharmacy: [facility], [place], [place], 1 tab(s) Oral daily, 62.25, in, 04/15/21 10:02:00 CDT, Height Measured, 166.4, lb, 04/15/21 10:02:00 CDT, Weight Measured  hydroCHLOROthiazide 25 mg oral tablet: = 1 tab(s) ( 25 mg ), Oral, daily, # 90 tab(s), 3 Refill(s), Type: Maintenance, Pharmacy: [place], [place], 1 tab(s) Oral daily, 62.25, in, 11/10/21 12:05:00 CST, Height Measured, 162.9, lb, 11/10/21 12:05:00 CST, Weight Measured  levothyroxine 125 mcg (0.125 mg) oral tablet: See Instructions, Instructions: TAKE 1 TABLET BY MOUTH EVERY DAY, # 90 EA, 3 Refill(s), Pharmacy: [place], [place], TAKE 1 TABLET BY MOUTH EVERY DAY, 62.25, in, 04/15/21 10:35:00 CDT, Height Measured, 166.4, lb, 04/15/21 10:02:00 CDT...  potassium chloride 10 mEq oral capsule, extended release: = 1 cap(s), Oral, daily, # 90 cap(s), 3 Refill(s), Type: Maintenance, Pharmacy: [place], [place], 1 cap(s) Oral daily, 62.25, in, 04/15/21 10:02:00 CDT, Height Measured, 166.4, lb, 04/15/21 10:02:00 CDT, Weight Measured  valsartan 80 mg oral tablet: = 1 tab(s) ( 80 mg ), Oral, daily, x 90 day(s), # 90 tab(s), 1 Refill(s), Type: Physician Stop, Pharmacy: [facility], [place], [place], 1 tab(s) Oral daily,x90 day(s), 62.25, in, 11/10/21 12:05:00 CST, Height Measured, 162.9, lb, 11/10/21 12:05:...,    Medications          *denotes recorded medication          albuterol 90 mcg/inh inhalation aerosol: 2 puff(s), NEB, q4 hrs, PRN: AS NEEDED FOR SHORTNESS OF BREATH OR WHEEZING, 1 EA, 2 Refill(s).          amLODIPine 10 mg oral tablet: 1 tab(s), Oral, daily, 90 tab(s), 1 Refill(s).          atorvastatin 20 mg oral tablet: 1 tab(s), Oral, daily, 90 tab(s), 3 Refill(s).          hydroCHLOROthiazide 25  mg oral tablet: 25 mg, 1 tab(s), Oral, daily, 90 tab(s), 3 Refill(s).          levothyroxine 125 mcg (0.125 mg) oral tablet: See Instructions, TAKE 1 TABLET BY MOUTH EVERY DAY, 90 EA, 3 Refill(s).          Metoprolol Succinate  mg oral tablet, extended release: 100 mg, 1 tab(s), Oral, daily, 90 tab(s), 3 Refill(s).          potassium chloride 10 mEq oral capsule, extended release: 1 cap(s), Oral, daily, 90 cap(s), 3 Refill(s).          valsartan 80 mg oral tablet: 80 mg, 1 tab(s), Oral, daily, for 90 day(s), 90 tab(s), 1 Refill(s).       Problem list:    All Problems  Seasonal allergies / SNOMED CT 154648544 / Confirmed  Osteopenia / SNOMED CT 846921562 / Confirmed  Obese / SNOMED CT 5433003512 / Probable  Asthma, mild intermittent / SNOMED CT 0175273293 / Confirmed  Keratoacanthoma / SNOMED CT 253883078 / Confirmed  Not definitive diagnosis, suspected, right arm.  Hypertension / SNOMED CT 2810981799 / Confirmed  Hyperlipidemia / SNOMED CT 23873884 / Confirmed  Ex-smoker / SNOMED CT 90129447 / Confirmed  Hypothyroidism / SNOMED CT 582033163 / Confirmed  Currently euthyroid  Aortic valve calcification / SNOMED CT 715706985 / Confirmed  Inactive: Finger mass, left / SNOMED CT 938077039  Inactive: Irritant contact dermatitis versus autoimmune process  Dermatology consult on 05/10/2005  Inactive: Has received third dose of hepatitis B vaccine / SNOMED CT 0583314717  Inactive: Eczema / SNOMED CT 78177775  Resolved: Mild persistent asthma / SNOMED CT 0595769790  Resolved: Rash, skin / SNOMED CT 714258632  Canceled: Osteopenia  Canceled: Eczema  Canceled: Eczema  Canceled: Asthma  Childhood      Histories   Past Medical History:    Active  Keratoacanthoma (664101064): Onset in 2007 at 52 years.  Comments:  2/22/2011 CST 11:38 AM CST - Gilda Arango  Not definitive diagnosis, suspected, right arm.  Hypertension (1108867409)  Asthma, mild intermittent (4590356219)  Ex-smoker (88779701)  Hypothyroidism  (837858749)  Comments:  2011 CST 10:38 AM Gilda Cox  Currently euthyroid  Seasonal allergies (474833461)  Osteopenia (470968837)  Obese (9609939412)  Aortic valve calcification (464381626)  Resolved  Rash, skin (103020672): Onset in the month of 2005 at 49 years  Resolved.  Mild persistent asthma (8648177727):  Resolved.   Family History:    Hypothyroid  Sister  Hypertension  Mother  Brother  Osteoporosis  Mother  Grandmother (M)     Procedure history:    Repair of shoulder (SNOMED CT 104791756) in  at 36 Years.  Comments:  2011 11:51 AM Gilda Cox  Repair of left shoulder dislocation.  Childbirth (SNOMED CT 2338869836) in  at 21 Years.  Comments:  2011 11:46 AM Gilda Cox  .  Toxemia at very end.  Tubal ligation (SNOMED CT 296854716).  Cardiac computed tomography for calcium scoring (SNOMED CT 6296835384).  Comments:  9/10/2014 9:51 AM CDT - Cinthya Lopez CMA  Done  - results Zero  Do an Echo in 1-2 years because of mild degeneration in the aortic valve   Social History:        Electronic Cigarette/Vaping Assessment            Electronic Cigarette Use: Never.      Alcohol Assessment            Wine, 1-2 times per week      Tobacco Assessment            Former smoker, quit more than 30 days ago      Substance Abuse Assessment            Never      Other Assessment            Immunization:, Per paper chart patient completed Hepatitis B series in . No dates. Not listed on W[facility].            Marital Status,         Physical Examination   VS/Measurements   General:  Alert and oriented, tearful.    Neck:  Supple, Non-tender.    Respiratory:  Lungs are clear to auscultation, Respirations are non-labored.       Impression and Plan   Diagnosis     Hypertension (EUA94-CA I10).     Grieving (NVG76-FL F43.21).     Patient Instructions:       Counseled: Patient, Regarding diagnosis, Regarding treatment, Regarding medications, Verbalized understanding, see me in 6  months med check, sooner if needed.    Orders     Orders (Selected)   Prescriptions  Prescribed  Metoprolol Succinate  mg oral tablet, extended release: = 1 tab(s) ( 100 mg ), Oral, daily, # 90 tab(s), 3 Refill(s), Type: Maintenance, Pharmacy: [place], [place], 1 tab(s) Oral daily, 62.25, in, 11/10/21 12:05:00 CST, Height Measured, 162.9, lb, 11/10/21 12:05:00 CST, Weight Measured  amLODIPine 10 mg oral tablet: = 1 tab(s), Oral, daily, # 90 tab(s), 1 Refill(s), Type: Maintenance, [facility] [place], 1 tab(s) Oral daily, 62.25, in, 11/10/21 12:05:00 CST, Height Measured, 162.9, lb, 11/10/21 12:05:00 CST, Weight Measured  hydroCHLOROthiazide 25 mg oral tablet: = 1 tab(s) ( 25 mg ), Oral, daily, # 90 tab(s), 3 Refill(s), Type: Maintenance, Pharmacy: [place], [place], 1 tab(s) Oral daily, 62.25, in, 11/10/21 12:05:00 CST, Height Measured, 162.9, lb, 11/10/21 12:05:00 CST, Weight Measured  valsartan 80 mg oral tablet: = 1 tab(s) ( 80 mg ), Oral, daily, x 90 day(s), # 90 tab(s), 1 Refill(s), Type: Physician Stop, Pharmacy: [place], [place], 1 tab(s) Oral daily,x90 day(s), 62.25, in, 11/10/21 12:05:00 CST, Height Measured, 162.9, lb, 11/10/21 12:05:....

## 2022-02-16 NOTE — TELEPHONE ENCOUNTER
---------------------  From: Nancy Pena   To: [facility] Message Pool ([id number]_[facility]);     Sent: 3/27/2020 11:08:26 AM CDT  Subject: General Message     Per a 3/2/20 letter from [facility] they have not been able to reach her to schedule an echocardiogram per order sent on 2/20/20 by [facility].---------------------  From: Rebecca Martell CMA ([facility] Message Pool ([id number]_[facility]))   To: Emmy Pa;     Sent: 3/30/2020 9:04:55 AM CDT  Subject: FW: General Messagenoted

## 2022-02-16 NOTE — TELEPHONE ENCOUNTER
Entered by April Lunsford CMA on February 03, 2020 8:00:57 AM CST  ---------------------  From: April Lunsford CMA   [id number]    Sent: 2/3/2020 8:00:57 AM CST  Subject: Medication Management     ** Submitted: **  Order:levothyroxine (levothyroxine 125 mcg (0.125 mg) oral tablet)  1 tab(s)  Oral  daily  Qty:  30 tab(s)        Days Supply:  30        Refills:  0          Substitutions Allowed     Route To Pharmacy - [facility]/pharmacy #[id number]    Signed by April Lunsford CMA  2/3/2020 8:00:00 AM    ** Submitted: **  Complete:levothyroxine (levothyroxine 125 mcg (0.125 mg) oral tablet)   Signed by April Lunsford CMA  2/3/2020 8:00:00 AM    ** Not Approved:  **  levothyroxine (LEVOTHYROXINE 125 MCG TABLET)  TAKE 1 TABLET BY MOUTH EVERY DAY  Qty:  30 tab(s)        Days Supply:  30        Refills:  0          Substitutions Allowed     Route To Pharmacy - [facility]/pharmacy #[id number]   Signed by April Lunsford CMA            ------------------------------------------  From: [facility]/pharmacy #[id number]  To: Emmy Pa  Sent: February 1, 2020 8:39:10 AM CST  Subject: Medication Management  Due: February 2, 2020 8:39:10 AM CST    ** On Hold Pending Signature **  Drug: levothyroxine (levothyroxine 125 mcg (0.125 mg) oral tablet)  TAKE 1 TABLET BY MOUTH EVERY DAY  Quantity: 30 tab(s)  Days Supply: 30  Refills: 0  Substitutions Allowed  Notes from Pharmacy:     Dispensed Drug: levothyroxine (levothyroxine 125 mcg (0.125 mg) oral tablet)  TAKE 1 TABLET BY MOUTH EVERY DAY  Quantity: 30 tab(s)  Days Supply: 30  Refills: 0  Substitutions Allowed  Notes from Pharmacy:   ------------------------------------------Med Refill      Date of last office visit and reason:  12/28/18; med check      Date of last Med Check / Px:   12/28/18  Date of last labs pertaining to med:  12/19/18    RTC order in chart:  yes; due now    For Protocol refill, has patient been contacted:  2/18/20 appt scheduled

## 2022-02-16 NOTE — PROGRESS NOTES
Patient:   ZE CASTLE            MRN: [id number]            FIN: [id number]               Age:   65 years     Sex:  Female     :  1955   Associated Diagnoses:   Hyperlipidemia; Hypertension; Hypothyroidism; Mild persistent asthma   Author:   Emmy Pa      Visit Information      Date of Service: 04/15/2021 09:47 am  Performing Location: [facility]  Encounter#: [id number]      Primary Care Provider (PCP):  ROCIO -UNKNOWN,    NPI# [id number]      Referring Provider:  Emmy Pa    NPI# [id number]      Chief Complaint   4/15/2021 10:02 AM CDT   here for med check/refills, is due for labs        History of Present Illness   here for med check, due for labs, has Medicare now  HTN--controlled on 4 agents, bp at home 130s/70s  hyperlipid--started statin last , doing well  thyroid--due for TSH ,no problesm  Asthma, well controlled on prn albuterol, former smoker  No CP, no SOB, needs to get to appt for COVID vaccine,       Health Status   Allergies:    Allergic Reactions (Selected)  Severity Not Documented  Mold (No reactions were documented)   Medications:  (Selected)   Prescriptions  Prescribed  Metoprolol Succinate  mg oral tablet, extended release: = 1 tab(s) ( 100 mg ), Oral, daily, # 90 tab(s), 3 Refill(s), Type: Maintenance, Pharmacy: [facility]/pharmacy #[id number], 1 tab(s) Oral daily, 62.25, in, 18 9:21:00 CST, Height Measured, 168.6, lb, 20 11:41:00 CDT, Weight Measured  albuterol 90 mcg/inh inhalation aerosol: 2 puff(s), NEB, q4 hrs, PRN: AS NEEDED FOR SHORTNESS OF BREATH OR WHEEZING, # 1 EA, 2 Refill(s), Type: Maintenance, Pharmacy: [facility]/pharmacy #[id number], 2 puff(s) NEB q4 hrs,PRN:AS NEEDED FOR SHORTNESS OF BREATH OR WHEEZING  amLODIPine 10 mg oral tablet: = 1 tab(s), Oral, daily, # 30 tab(s), 0 Refill(s), Type: Maintenance, Pharmacy: [facility]/pharmacy #[id number], Needs appt for further refills, 1 tab(s) Oral daily, 168.6, lb, 20 11:41:00 CDT, Weight  Measured  atorvastatin 20 mg oral tablet: = 1 tab(s), Oral, daily, # 90 tab(s), 3 Refill(s), Type: Maintenance, [id number], 1 tab(s) Oral daily, 62.25, in, 12/28/18 9:21:00 CST, Height Measured, Weight Measured  hydroCHLOROthiazide 25 mg oral tablet: = 1 tab(s) ( 25 mg ), Oral, daily, # 90 tab(s), 3 Refill(s), Type: Maintenance, [id number], 1 tab(s) Oral daily, 62.25, in, 12/28/18 9:21:00 CST, Height Measured, 168.6, lb, 08/18/20 11:41:00 CDT, Weight Measured  levothyroxine 125 mcg (0.125 mg) oral tablet: See Instructions, Instructions: TAKE 1 TABLET BY MOUTH EVERY DAY, # 30 tab(s), 0 Refill(s), Pharmacy: [facility] [id number], TAKE 1 TABLET BY MOUTH EVERY DAY, 168.6, lb, 08/18/20 11:41:00 CDT, Weight Measured  potassium chloride 10 mEq oral capsule, extended release: = 1 cap(s), Oral, daily, # 30 cap(s), 0 Refill(s), Type: Maintenance, [id number], Needs appt for further refills, 1 cap(s) Oral daily, 168.6, lb, 08/18/20 11:41:00 CDT, Weight Measured  valsartan 80 mg oral tablet: See Instructions, Instructions: TAKE 1 TABLET BY MOUTH EVERY DAY, # 90 EA, 1 Refill(s), Pharmacy: [facility], [place], [place], TAKE 1 TABLET BY MOUTH EVERY DAY, 62.25, in, 04/15/21 10:02:00 CDT, Height Measured, 166.4, lb, 04/15/21 10:02:00 CDT...,    Medications          *denotes recorded medication          albuterol 90 mcg/inh inhalation aerosol: 2 puff(s), NEB, q4 hrs, PRN: AS NEEDED FOR SHORTNESS OF BREATH OR WHEEZING, 1 EA, 2 Refill(s).          amLODIPine 10 mg oral tablet: 1 tab(s), Oral, daily, 30 tab(s), 0 Refill(s).          atorvastatin 20 mg oral tablet: 1 tab(s), Oral, daily, 90 tab(s), 3 Refill(s).          hydroCHLOROthiazide 25 mg oral tablet: 25 mg, 1 tab(s), Oral, daily, 90 tab(s), 3 Refill(s).          levothyroxine 125 mcg (0.125 mg) oral tablet: See Instructions, TAKE 1 TABLET BY MOUTH EVERY DAY, 30 tab(s), 0 Refill(s).          Metoprolol Succinate  mg  oral tablet, extended release: 100 mg, 1 tab(s), Oral, daily, 90 tab(s), 3 Refill(s).          potassium chloride 10 mEq oral capsule, extended release: 1 cap(s), Oral, daily, 30 cap(s), 0 Refill(s).          valsartan 80 mg oral tablet: See Instructions, TAKE 1 TABLET BY MOUTH EVERY DAY, 90 EA, 1 Refill(s).       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  Asthma, mild intermittent / SNOMED CT 2705142372 / Confirmed  Obese / SNOMED CT 2231094505 / Probable  Osteopenia / SNOMED CT 292667100 / Confirmed  Seasonal allergies / SNOMED CT 772528281 / Confirmed  Inactive: Eczema / SNOMED CT 60853565  Inactive: Has received third dose of hepatitis B vaccine / SNOMED CT 2511386962  Inactive: Irritant contact dermatitis versus autoimmune process  Dermatology consult on 05/10/2005  Inactive: Finger mass, left / SNOMED CT 229232431  Resolved: Rash, skin / SNOMED CT 803533322  Resolved: Mild persistent asthma / SNOMED CT 6959380541  Canceled: Asthma  Childhood  Canceled: Eczema  Canceled: Eczema  Canceled: Osteopenia      Histories   Past Medical History:    Active  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  Comments:  2/22/2011 CST 11:38 AM Gilda Cox  Not definitive diagnosis, suspected, right arm.  Hypertension (2927951159)  Asthma, mild intermittent (9314801077)  Ex-smoker (98157513)  Hypothyroidism (338261401)  Comments:  2/22/2011 CST 10:38 AM Gilda Cox  Currently euthyroid  Seasonal allergies (747206541)  Osteopenia (260648920)  Obese (8804460553)  Aortic valve calcification (383211876)  Resolved  Rash, skin (604284492): Onset in the  month of 2005 at 49 years  Resolved.  Mild persistent asthma (8885754026):  Resolved.   Family History:    Hypothyroid  Sister  Hypertension  Mother  Brother  Osteoporosis  Mother  Grandmother (M)     Procedure history:    Repair of shoulder (SNOMED CT 929666060) in  at 36 Years.  Comments:  2011 11:51 AM Gilda Cox  Repair of left shoulder dislocation.  Childbirth (SNOMED CT 6796406169) in  at 21 Years.  Comments:  2011 11:46 AM Gilda Cox  .  Toxemia at very end.  Tubal ligation (SNOMED CT 949438378).  Cardiac computed tomography for calcium scoring (SNOMED CT 9665252834).  Comments:  9/10/2014 9:51 AM [facility] - Cinthya Lopez CMA  Done  - results Zero  Do an Echo in 1-2 years because of mild degeneration in the aortic valve   Social History:        Electronic Cigarette/Vaping Assessment            Electronic Cigarette Use: Never.      Alcohol Assessment            Wine, 1-2 times per week      Tobacco Assessment            Former smoker, quit more than 30 days ago      Substance Abuse Assessment            Never      Other Assessment            Immunization:, Per paper chart patient completed Hepatitis B series in . No dates. Not listed on WIR.            Marital Status,         Physical Examination   VS/Measurements   Neck:  Supple, Non-tender.    Respiratory:  Lungs are clear to auscultation, Respirations are non-labored.    Cardiovascular:  Normal rate, Regular rhythm, No murmur, 1 plus edema legs.    Integumentary:  Warm, Dry, Pink.    Neurologic:  Alert, Oriented.    Psychiatric:  Cooperative.       Impression and Plan   Diagnosis     Hyperlipidemia (GAO00-VU E78.5).     Hypertension (CPP96-XX I10).     Hypothyroidism (JTY07-OG E06.3).     Mild persistent asthma (MSR37-LW J45.30).     Plan:  see ACT and AAP, well controlled asthma  labs today, meds refilled  she agrees to see me in 6 months , can be virtuall Annual Medicare Wellness.    Patient Instructions:        Counseled: Patient, Regarding diagnosis, Regarding treatment, Regarding medications, Verbalized understanding.    Orders     Orders (Selected)   Outpatient Orders  Ordered (Dispatched)  Basic Metabolic Panel* ([facility]): Specimen Type: Serum, Collection Date: 04/15/21 10:25:00 CDT  Lipid panel with reflex to direct ldl* ([facility]): Specimen Type: Serum, Collection Date: 04/15/21 10:25:00 CDT  TSH* ([facility]): Specimen Type: Serum, Collection Date: 04/15/21 10:25:00 CDT  Prescriptions  Prescribed  Metoprolol Succinate  mg oral tablet, extended release: = 1 tab(s) ( 100 mg ), Oral, daily, # 90 tab(s), 1 Refill(s), Type: Maintenance, Pharmacy: [place], [place], 1 tab(s) Oral daily, 62.25, in, 04/15/21 10:02:00 CDT, Height Measured, 166.4, lb, 04/15/21 10:02:00 CDT, Weight Measured  albuterol 90 mcg/inh inhalation aerosol: 2 puff(s), NEB, q4 hrs, PRN: AS NEEDED FOR SHORTNESS OF BREATH OR WHEEZING, # 1 EA, 2 Refill(s), Type: Maintenance, Pharmacy: [place], [place], 2 puff(s) NEB q4 hrs,PRN:AS NEEDED FOR SHORTNESS OF BREATH OR WHEEZING, 62.25, in, 04/15/...  amLODIPine 10 mg oral tablet: = 1 tab(s), Oral, daily, # 90 tab(s), 1 Refill(s), Type: Maintenance, Pharmacy: [place], [place], 1 tab(s) Oral daily, 62.25, in, 04/15/21 10:02:00 CDT, Height Measured, 166.4, lb, 04/15/21 10:02:00 CDT, Weight Measured  atorvastatin 20 mg oral tablet: = 1 tab(s), Oral, daily, # 90 tab(s), 3 Refill(s), Type: Maintenance, Pharmacy: [place], [place], 1 tab(s) Oral daily, 62.25, in, 04/15/21 10:02:00 CDT, Height Measured, 166.4, lb, 04/15/21 10:02:00 CDT, Weight Measured  hydroCHLOROthiazide 25 mg oral tablet: = 1 tab(s) ( 25 mg ), Oral, daily, # 90 tab(s), 1 Refill(s), Type: Maintenance, Pharmacy: [facility], [place], [place], 1 tab(s) Oral daily, 62.25, in, 04/15/21 10:02:00 CDT, Height Measured, 166.4, lb, 04/15/21 10:02:00 CDT, Weight Measured  potassium  chloride 10 mEq oral capsule, extended release: = 1 cap(s), Oral, daily, # 90 cap(s), 3 Refill(s), Type: Maintenance, Pharmacy: [facility], [place], [place], 1 cap(s) Oral daily, 62.25, in, 04/15/21 10:02:00 CDT, Height Measured, 166.4, lb, 04/15/21 10:02:00 CDT, Weight Measured  valsartan 80 mg oral tablet: See Instructions, Instructions: TAKE 1 TABLET BY MOUTH EVERY DAY, # 90 EA, 1 Refill(s), Pharmacy: [facility], [place], [place], TAKE 1 TABLET BY MOUTH EVERY DAY, 62.25, in, 04/15/21 10:02:00 CDT, Height Measured, 166.4, lb, 04/15/21 10:02:00 CDT....

## 2022-02-16 NOTE — NURSING NOTE
Quick Intake Entered On:  4/15/2021 10:35 AM CDT    Performed On:  4/15/2021 10:35 AM CDT by Emmy Pa               Summary   Height Measured :   62.25 in(Converted to: 5 ft 2 in, 158.11 cm)    Systolic Blood Pressure :   138 mmHg (HI)    Diastolic Blood Pressure :   58 mmHg (LOW)    Mean Arterial Pressure :   85 mmHg   Emmy Pa - 4/15/2021 10:35 AM CDT

## 2022-02-16 NOTE — NURSING NOTE
CAGE Assessment Entered On:  2/18/2020 9:25 AM CST    Performed On:  2/18/2020 9:24 AM CST by Nereida Chairez LPN               Assessment   Have you ever felt you should cut down on your drinking :   No   Have people annoyed you by criticizing your drinking :   No   Have you ever felt bad or guilty about your drinking :   No   Have you ever taken a drink first thing in the morning to steady your nerves or get rid of a hangover (Eye-opener) :   No   CAGE Score :   0    Nereida Chairez LPN - 2/18/2020 9:24 AM CST

## 2022-02-16 NOTE — TELEPHONE ENCOUNTER
---------------------  From: Brittany Arciniega LPN (Phone Messages Pool ([id number]_[facility]))   Sent: 10/15/2021 10:19:22 AM CDT  Subject: refills     Phone Message    PCP:   [facility]     Time of Call:  10:12am       Person Calling:  pt  Phone number:  [phone]      Note:   Pt LM stating [facility] is waiting on refills for metoprolol an amlodipine.  Pt says she is leaving Monday to go out of town for 2 weeks.    Pt says no need to call she will wait for notification from pharmacy.  30 day protocol sent.  Per note 4/15 pt to see [facility] in 6 months- no RTC placed that day.  RTC was placed today- pt due now for AWV. Will also send note to pharmacy    Last office visit and reason:  4/15/21 med check

## 2022-02-16 NOTE — NURSING NOTE
Asthma Control Test (ACT) Total Entered On:  8/18/2020 1:04 PM CDT    Performed On:  8/18/2020 1:04 PM CDT by Nereida Chairez LPN               Asthma Control Test (ACT) Total   Asthma Control Test Total (Adult) :   23    Asthma Action Plan Provided? :   No   Nereida Chairez LPN - 8/18/2020 1:04 PM CDT

## 2022-02-16 NOTE — TELEPHONE ENCOUNTER
Entered by April Lunsford CMA on March 18, 2021 7:18:33 AM CDT  ---------------------  From: April Lunsford CMA   To: [facility]/pharmacy #[id number]    Sent: 3/18/2021 7:18:33 AM CDT  Subject: Medication Management     ** Rx Change Denied: Change not appropriate **  (POTASSIUM CL ER 10 MEQ CAPSULE)   TAKE 1 CAPSULE BY MOUTH EVERY DAY  Qty:  30 cap(s)        Days Supply:  30        Refills:  0          Substitutions Allowed     Route To Pharmacy - [facility]/pharmacy #[id number]   Note from Pharmacy:  REQUEST FOR 90 DAYS PRESCRIPTION.  Signed by April Lunsford CMA            From: [facility] STORE [id number]  To: Emmy Pa  Sent: March 16, 2021 2:57:21 PM CDT  Subject: Medication Management  Due: March 17, 2021 2:57:21 PM CDT     Originally Prescribed Drug:  Drug: potassium chloride (potassium chloride 10 mEq oral capsule, extended release), TAKE 1 CAPSULE BY MOUTH EVERY DAY  Quantity: 30 cap(s)  Days Supply: 30  Refills: 0  Substitutions Allowed  Notes from Pharmacy: REQUEST FOR 90 DAYS PRESCRIPTION.     ** On Hold Pending Signature **  Preferred Alternative Drug: potassium chloride (potassium chloride 10 mEq oral capsule, extended release), TAKE 1 CAPSULE BY MOUTH EVERY DAY  Quantity: 90 cap(s)  Days Supply: 90  Refills: 1  Substitutions Allowed  Notes from Pharmacy: REQUEST FOR 90 DAYS PRESCRIPTION.

## 2022-02-16 NOTE — TELEPHONE ENCOUNTER
Entered by Analisa Daniel CMA on August 21, 2019 8:54:45 AM CDT  Pt due back 12/2019 for annual exam.      ** Patient matched by Analisa Daniel CMA on 8/21/2019 8:54:03 AM CDT **      ------------------------------------------  From: [facility]/pharmacy #[id number]  To: Emmy Pa  Sent: August 21, 2019 1:10:01 AM CDT  Subject: Medication Management  Due: August 22, 2019 1:10:01 AM CDT    ** On Hold Pending Signature **  Drug: albuterol (ProAir HFA 90 mcg/inh inhalation aerosol)  INHALE 2 PUFFS BY MOUTH EVERY 4 HOURS AS NEEDED FOR SHORTNESS OF BREATH OR WHEEZING  Quantity: 25.5 inhalers   Days Supply: 16        Refills: 1  Substitutions Allowed  Notes from Pharmacy:     Dispensed Drug: albuterol (albuterol 90 mcg/inh inhalation aerosol)  INHALE 2 PUFFS BY MOUTH EVERY 4 HOURS AS NEEDED FOR SHORTNESS OF BREATH OR WHEEZING  Quantity: 8.5 inhalers   Days Supply: 16        Refills: 5  Substitutions Allowed  Notes from Pharmacy:   ---------------------------------------------------------------  From: Analisa Daniel CMA   To: [facility]/pharmacy #[id number]    Sent: 8/21/2019 8:55:12 AM CDT  Subject: Medication Management     ** Submitted: **  Order:albuterol (albuterol 90 mcg/inh inhalation aerosol)  2 puff(s)  NEB  q4 hrs  Qty:  1 EA        Refills:  2          Substitutions Allowed     PRN  AS NEEDED FOR SHORTNESS OF BREATH OR WHEEZING      Route To Pharmacy - [facility]/pharmacy #[id number]    Signed by Analisa Daniel CMA  8/21/2019 8:54:00 AM    ** Submitted: **  Complete:albuterol (ProAir HFA 90 mcg/inh inhalation aerosol)   Signed by Analisa Daniel CMA  8/21/2019 8:55:00 AM    ** Not Approved:  **  albuterol (ALBUTEROL HFA (PROAIR) INHALER)  INHALE 2 PUFFS BY MOUTH EVERY 4 HOURS AS NEEDED FOR SHORTNESS OF BREATH OR WHEEZING  Qty:  8.5 inhalers        Days Supply:  16        Refills:  5          Substitutions Allowed     Route To Pharmacy - [facility]/pharmacy #[id number]   Signed by Analisa Daniel CMA

## 2022-02-16 NOTE — TELEPHONE ENCOUNTER
Entered by April Lunsford CMA on April 20, 2021 2:54:07 PM CDT  ---------------------  From: April Lunsford CMA   To: [facility]/pharmacy #[id number]    Sent: 4/20/2021 2:54:07 PM CDT  Subject: Medication Management     ** Rx Change Denied: Change not appropriate, #90, 1 sent 4/15/21 **  (VALSARTAN 80 MG TABLET)   TAKE 1 TABLET BY MOUTH EVERY DAY  Qty:  30 tab(s)        Days Supply:  30        Refills:  0          Substitutions Allowed     Route To Pharmacy - [facility]/pharmacy #[id number]   Note from Pharmacy:  REQUEST FOR 90 DAYS PRESCRIPTION.  Signed by April Lunsford CMA            From: [facility] STORE [id number]  To: Emmy Pa  Sent: April 19, 2021 9:12:03 AM CDT  Subject: Medication Management  Due: April 20, 2021 9:12:03 AM CDT     Originally Prescribed Drug:  Drug: valsartan (valsartan 80 mg oral tablet), TAKE 1 TABLET BY MOUTH EVERY DAY  Quantity: 30 tab(s)  Days Supply: 30  Refills: 0  Substitutions Allowed  Notes from Pharmacy: REQUEST FOR 90 DAYS PRESCRIPTION.     ** On Hold Pending Signature **  Preferred Alternative Drug: valsartan (valsartan 80 mg oral tablet), TAKE 1 TABLET BY MOUTH EVERY DAY  Quantity: 90 tab(s)  Days Supply: 90  Refills: 1  Substitutions Allowed  Notes from Pharmacy: REQUEST FOR 90 DAYS PRESCRIPTION.

## 2022-02-16 NOTE — TELEPHONE ENCOUNTER
Entered by April Lunsford CMA on February 08, 2021 7:04:50 AM CST  ---------------------  From: April Lunsford CMA   [id number]    Sent: 2/8/2021 7:04:50 AM CST  Subject: Medication Management     ** Submitted: **  Order:valsartan (valsartan 80 mg oral tablet)  1 tab(s)  Oral  daily  Qty:  30 tab(s)        Refills:  0          Substitutions Allowed     [id number]    Signed by April Lunsford CMA  2/8/2021 1:04:00 PM [facility]    ** Submitted: **  Complete:valsartan (valsartan 40 mg oral tablet)   Signed by April Lunsford CMA  2/8/2021 1:04:00 PM [facility]    ** Submitted: **  Complete:valsartan (valsartan 80 mg oral tablet)   Signed by April Lunsford CMA  2/8/2021 1:04:00 PM [facility]    ** Not Approved:  **  valsartan (VALSARTAN 80 MG TABLET)  TAKE 1 TABLET BY MOUTH ONCE DAILY  Qty:  90 tab(s)        Days Supply:  90        Refills:  1          Substitutions Allowed     [id number]   Signed by April Lunsford CMA            ------------------------------------------  From: [facility] STORE [id number]  To: Emmy Pa  Sent: February 8, 2021 12:24:44 AM CST  Subject: Medication Management  Due: January 30, 2021 4:21:54 PM CST     ** On Hold Pending Signature **     Dispensed Drug: valsartan (valsartan 80 mg oral tablet), TAKE 1 TABLET BY MOUTH ONCE DAILY  Quantity: 90 tab(s)  Days Supply: 90  Refills: 1  Substitutions Allowed  Notes from Pharmacy:  ------------------------------------------8/18/20 chronic disease  due now for f/u

## 2022-02-16 NOTE — NURSING NOTE
Comprehensive Intake Entered On:  8/18/2020 11:45 AM CDT    Performed On:  8/18/2020 11:41 AM CDT by Nereida Chairez LPN               Summary   Chief Complaint :   med check/refills, BP medication was changed 2/2020   Weight Measured :   168.6 lb(Converted to: 168 lb 10 oz, 76.48 kg)    Systolic Blood Pressure :   138 mmHg (HI)    Diastolic Blood Pressure :   76 mmHg   Mean Arterial Pressure :   97 mmHg   Peripheral Pulse Rate :   64 bpm   BP Site :   Right arm   BP Method :   Manual   Temperature Tympanic :   98.3 DegF(Converted to: 36.8 DegC)    Oxygen Saturation :   98 %   Nereida Chairez LPN - 8/18/2020 11:41 AM CDT   Health Status   Allergies Verified? :   Yes   Medication History Verified? :   Yes   Medical History Verified? :   No   Pre-Visit Planning Status :   Completed   Tobacco Use? :   Never smoker   Nereida Chairez LPN - 8/18/2020 11:41 AM CDT   Meds / Allergies   (As Of: 8/18/2020 11:45:13 AM CDT)   Allergies (Active)   Mold  Estimated Onset Date:   Unspecified ; Created By:   Gilda Arango; Reaction Status:   Active ; Category:   Drug ; Substance:   Mold ; Type:   Allergy ; Updated By:   Gilda Arango; Reviewed Date:   12/28/2018 9:25 AM CST        Medication List   (As Of: 8/18/2020 11:45:13 AM CDT)   Prescription/Discharge Order    potassium chloride  :   potassium chloride ; Status:   Prescribed ; Ordered As Mnemonic:   potassium chloride 10 mEq oral capsule, extended release ; Simple Display Line:   10 mEq, 1 cap(s), PO, daily, 90 cap(s), 3 Refill(s) ; Ordering Provider:   Emmy Pa; Catalog Code:   potassium chloride ; Order Dt/Tm:   2/18/2020 9:04:21 AM CST          amLODIPine  :   amLODIPine ; Status:   Prescribed ; Ordered As Mnemonic:   amLODIPine 10 mg oral tablet ; Simple Display Line:   10 mg, 1 tab(s), Oral, daily, 90 tab(s), 1 Refill(s) ; Ordering Provider:   Emmy Pa; Catalog Code:   amLODIPine ; Order Dt/Tm:   2/18/2020 9:02:49 AM CST           hydroCHLOROthiazide  :   hydroCHLOROthiazide ; Status:   Prescribed ; Ordered As Mnemonic:   hydroCHLOROthiazide 25 mg oral tablet ; Simple Display Line:   25 mg, 1 tab(s), Oral, daily, 90 tab(s), 1 Refill(s) ; Ordering Provider:   Emmy Pa; Catalog Code:   hydroCHLOROthiazide ; Order Dt/Tm:   2/18/2020 9:03:05 AM CST          valsartan  :   valsartan ; Status:   Prescribed ; Ordered As Mnemonic:   valsartan 80 mg oral tablet ; Simple Display Line:   80 mg, 1 tab(s), Oral, daily, to replace losartan, 90 tab(s), 1 Refill(s) ; Ordering Provider:   Emmy Pa; Catalog Code:   valsartan ; Order Dt/Tm:   2/18/2020 9:01:32 AM CST          atorvastatin  :   atorvastatin ; Status:   Prescribed ; Ordered As Mnemonic:   atorvastatin 20 mg oral tablet ; Simple Display Line:   1 tab(s), Oral, daily, 30 tab(s), 0 Refill(s) ; Ordering Provider:   Emmy Pa; Catalog Code:   atorvastatin ; Order Dt/Tm:   8/12/2020 8:59:29 AM CDT          levothyroxine  :   levothyroxine ; Status:   Prescribed ; Ordered As Mnemonic:   levothyroxine 125 mcg (0.125 mg) oral tablet ; Simple Display Line:   1 tab(s), Oral, daily, 90 tab(s), 3 Refill(s) ; Ordering Provider:   Emmy Pa; Catalog Code:   levothyroxine ; Order Dt/Tm:   2/28/2020 9:02:06 AM CST          albuterol  :   albuterol ; Status:   Prescribed ; Ordered As Mnemonic:   albuterol 90 mcg/inh inhalation aerosol ; Simple Display Line:   2 puff(s), NEB, q4 hrs, PRN: AS NEEDED FOR SHORTNESS OF BREATH OR WHEEZING, 1 EA, 2 Refill(s) ; Ordering Provider:   Emmy Pa; Catalog Code:   albuterol ; Order Dt/Tm:   2/18/2020 9:09:28 AM CST          metoprolol  :   metoprolol ; Status:   Prescribed ; Ordered As Mnemonic:   Metoprolol Succinate  mg oral tablet, extended release ; Simple Display Line:   See Instructions, TAKE 1 TABLET BY MOUTH EVERY DAY, 90 tab(s), 1 Refill(s) ; Ordering Provider:   Emmy Pa; Catalog Code:   metoprolol ;  Order Dt/Tm:   2/18/2020 7:55:26 AM CST            ID Risk Screen   Recent Travel History :   No recent travel   Family Member Travel History :   No recent travel   Other Exposure to Infectious Disease :   Unknown   Nereida Chairez LPN - 8/18/2020 11:41 AM CDT

## 2022-02-16 NOTE — PROGRESS NOTES
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  She had to change sartan med, still some trouble with supply  She has no SOB or chest pain or ankle swelling  she started a statin in Feb as 10 yr risk score was 8.6, is tolerating well, will recheck labs today, she is not fasting  former smoker      Health Status   Allergies:    Allergic Reactions (Selected)  Severity Not Documented  Mold (No reactions were documented)   Medications:  (Selected)   Prescriptions  Prescribed  Metoprolol Succinate  mg oral tablet, extended release: See Instructions, Instructions: TAKE 1 TABLET BY MOUTH EVERY DAY, # 90 tab(s), 1 Refill(s), KLARISSA, Type: Maintenance, Pharmacy: [id number], TAKE 1 TABLET BY MOUTH EVERY DAY  albuterol 90 mcg/inh inhalation aerosol: 2 puff(s), NEB, q4 hrs, PRN: AS NEEDED FOR SHORTNESS OF BREATH OR WHEEZING, # 1 EA, 2 Refill(s), Type: Maintenance, Pharmacy: [facility]/pharmacy #[id number], 2 puff(s) NEB q4 hrs,PRN:AS NEEDED FOR SHORTNESS OF BREATH OR WHEEZING  amLODIPine 10 mg oral tablet: = 1 tab(s) ( 10 mg ), Oral, daily, # 90 tab(s), 1 Refill(s), Type: Maintenance, Pharmacy: [facility]/pharmacy #[id number], 1 tab(s) Oral daily, 62.25, in, 18 9:21:00 CST, Height Measured, 168.6, lb, 20 11:41:00 CDT, Weight Measured  atorvastatin 20 mg oral tablet: = 1  tab(s), Oral, daily, # 30 tab(s), 0 Refill(s), Type: Maintenance, Pharmacy: [facility]/pharmacy #[id number], 62.25, in, 12/28/18 9:21:00 CST, Height Measured, 164, lb, 02/18/20 8:40:00 CST, Weight Measured  hydroCHLOROthiazide 25 mg oral tablet: = 1 tab(s) ( 25 mg ), Oral, daily, # 90 tab(s), 1 Refill(s), Type: Maintenance, Pharmacy: [facility]/pharmacy #[id number], 1 tab(s) Oral daily  levothyroxine 125 mcg (0.125 mg) oral tablet: = 1 tab(s), Oral, daily, # 90 tab(s), 3 Refill(s), Type: Maintenance, [id number], 1 tab(s) Oral daily  potassium chloride 10 mEq oral capsule, extended release: = 1 cap(s) ( 10 mEq ), PO, daily, # 90 cap(s), 3 Refill(s), Type: Maintenance, Pharmacy: [facility]/pharmacy #[id number], 1 cap(s) Oral daily  valsartan 80 mg oral tablet: = 1 tab(s) ( 80 mg ), Oral, daily, Instructions: to replace losartan, # 90 tab(s), 1 Refill(s), Type: Maintenance, [id number], 1 tab(s) Oral daily,Instr:to replace losartan,    Medications          *denotes recorded medication          albuterol 90 mcg/inh inhalation aerosol: 2 puff(s), NEB, q4 hrs, PRN: AS NEEDED FOR SHORTNESS OF BREATH OR WHEEZING, 1 EA, 2 Refill(s).          amLODIPine 10 mg oral tablet: 10 mg, 1 tab(s), Oral, daily, 90 tab(s), 1 Refill(s).          atorvastatin 20 mg oral tablet: 1 tab(s), Oral, daily, 30 tab(s), 0 Refill(s).          hydroCHLOROthiazide 25 mg oral tablet: 25 mg, 1 tab(s), Oral, daily, 90 tab(s), 1 Refill(s).          levothyroxine 125 mcg (0.125 mg) oral tablet: 1 tab(s), Oral, daily, 90 tab(s), 3 Refill(s).          Metoprolol Succinate  mg oral tablet, extended release: See Instructions, TAKE 1 TABLET BY MOUTH EVERY DAY, 90 tab(s), 1 Refill(s).          potassium chloride 10 mEq oral capsule, extended release: 10 mEq, 1 cap(s), PO, daily, 90 cap(s), 3 Refill(s).          valsartan 80 mg oral tablet: 80 mg, 1 tab(s), Oral, daily, to replace losartan, 90 tab(s), 1 Refill(s).       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  Obese / SNOMED CT 5879499435 / Probable  Osteopenia / SNOMED CT 857615967 / Confirmed  Seasonal allergies / SNOMED CT 638625510 / Confirmed  Inactive: Eczema / SNOMED CT 36461309  Inactive: Finger mass, left / SNOMED CT 788247958  Inactive: Has received third dose of hepatitis B vaccine / SNOMED CT 9130458054  Inactive: Irritant contact dermatitis versus autoimmune process  Dermatology consult on 05/10/2005  Resolved: Mild persistent asthma / SNOMED CT 7438000460  Resolved: Rash, skin / SNOMED CT 980764518  Canceled: Asthma  Childhood  Canceled: Eczema  Canceled: Eczema  Canceled: Osteopenia      Histories   Past Medical History:    Active  Keratoacanthoma (535084941): Onset in 2007 at 52 years.  Comments:  2/22/2011 CST 11:38 AM Gilda Cox  Not definitive diagnosis, suspected, right arm.  Hypertension (0280619320)  Asthma, mild intermittent (4519071849)  Ex-smoker (69406796)  Hypothyroidism (913651200)  Comments:  2/22/2011 CST 10:38 AM Gilda Cox  Currently euthyroid  Seasonal allergies (353656225)  Osteopenia (504697357)  Obese (3258370550)  Aortic valve calcification (616874102)  Resolved  Rash, skin (762757374): Onset in the month of 4/2005 at 49 years  Resolved.  Mild persistent asthma (2314786956):  Resolved.   Family History:    Hypothyroid  Sister  Hypertension  Mother  Brother  Osteoporosis  Mother  Grandmother (M)     Procedure history:    Repair of shoulder (SNOMED CT 518763612) in 1991 at 36 Years.  Comments:  2/22/2011 11:51 AM Gilda Cox  Repair of left shoulder dislocation.  Childbirth  (SNOMED CT 2256951844) in 1976 at 21 Years.  Comments:  2011 11:46 AM CST - Gilda Arango.  Toxemia at very end.  Tubal ligation (SNOMED CT 614131287).  Cardiac computed tomography for calcium scoring (SNOMED CT 7551467578).  Comments:  9/10/2014 9:51 AM CDT - Jessica ROSARIOCinthya  Done  - results Zero  Do an Echo in 1-2 years because of mild degeneration in the aortic valve   Social History:        Alcohol Assessment            Wine, 1-2 times per week      Substance Abuse Assessment            Never      Other Assessment            Immunization:, Per paper chart patient completed Hepatitis B series in . No dates. Not listed on WIR.            Marital Status,         Physical Examination   VS/Measurements   General:  Alert and oriented, No acute distress.    Eye:  Normal conjunctiva.    HENT:  Normal hearing.    Neck:  Supple, Non-tender, No lymphadenopathy.    Respiratory:  Lungs are clear to auscultation, Respirations are non-labored, Breath sounds are equal, Symmetrical chest wall expansion.    Cardiovascular:  Normal rate, Regular rhythm, No murmur, No edema.    Musculoskeletal:  Normal range of motion, Normal gait.    Integumentary:  Warm, Dry, Pink, No rash.    Neurologic:  Alert, Oriented.    Psychiatric:  Cooperative.       Impression and Plan   Diagnosis     Hyperlipidemia (YIG49-PC E78.5).     Hypertension (QVR29-IT I10).     Patient Instructions:       Counseled: Patient, Regarding diagnosis, Regarding treatment, Regarding medications, Verbalized understanding.    Orders     Orders (Selected)   Outpatient Orders  Ordered (In Transit)  Lipid panel with reflex to direct ldl* ([facility]): Specimen Type: Serum, Collection Date: 20 12:00:00 CDT  Prescriptions  Prescribed  Metoprolol Succinate  mg oral tablet, extended release: See Instructions, Instructions: TAKE 1 TABLET BY MOUTH EVERY DAY, # 90 EA, 3 Refill(s), KLARISSA, Pharmacy: [facility]/pharmacy #[id number], TAKE 1 TABLET BY MOUTH EVERY  DAY, 62.25, in, 12/28/18 9:21:00 CST, Height Measured, 168.6, lb, 08/18/20 11:41:00 CDT, Weight Me...  amLODIPine 10 mg oral tablet: = 1 tab(s) ( 10 mg ), Oral, daily, # 90 tab(s), 1 Refill(s), Type: Maintenance, Pharmacy: [facility]/pharmacy #[id number], 1 tab(s) Oral daily, 62.25, in, 12/28/18 9:21:00 CST, Height Measured, 168.6, lb, 08/18/20 11:41:00 CDT, Weight Measured  atorvastatin 20 mg oral tablet: = 1 tab(s), Oral, daily, # 90 tab(s), 3 Refill(s), Type: Maintenance, Pharmacy: [facility]/pharmacy #[id number], 1 tab(s) Oral daily, 62.25, in, 12/28/18 9:21:00 CST, Height Measured, Weight Measured  hydroCHLOROthiazide 25 mg oral tablet: = 1 tab(s) ( 25 mg ), Oral, daily, # 90 tab(s), 3 Refill(s), Type: Maintenance, Pharmacy: [facility]/pharmacy #[id number], 1 tab(s) Oral daily, 62.25, in, 12/28/18 9:21:00 CST, Height Measured, 168.6, lb, 08/18/20 11:41:00 CDT, Weight Measured  valsartan 80 mg oral tablet: = 1 tab(s) ( 80 mg ), Oral, daily, Instructions: to replace losartan, # 90 tab(s), 1 Refill(s), Type: Maintenance, Pharmacy: [facility]/pharmacy #[id number], 1 tab(s) Oral daily,Instr:to replace losartan, 62.25, in, 12/28/18 9:21:00 CST, Height Measured, 168.6, l....

## 2022-02-16 NOTE — LETTER
(Inserted Image. Unable to display)         February 09, 2021        ZE CASTLE  [address] [place] [id number]        Dear ZE,    Thank you for selecting [facility] for your healthcare needs.    Our records indicate you are due for the following services:     Hypertension check ~ Please remember to bring your at-home blood pressure readings with you to your appointment.     Non-Fasting Labs    If you had your labs done at another facility or with Direct Access Lab Testing at [facility], please bring in a copy of the results to your next visit, mail a copy, or drop off a copy of your results to your Healthcare Provider.     (FYI   Regarding office visits: In some instances, a video visit or telephone visit may be offered as an option.)    To schedule an appointment or if you have further questions, please contact your clinic at [phone].    Powered by [employer]    Sincerely,     CARLOS Bacon

## 2022-02-16 NOTE — NURSING NOTE
Comprehensive Intake Entered On:  4/15/2021 10:06 AM CDT    Performed On:  4/15/2021 10:02 AM CDT by Nereida Chairez LPN               Summary   Chief Complaint :   here for med check/refills, is due for labs   Weight Measured :   166.4 lb(Converted to: 166 lb 6 oz, 75.478 kg)    Height Measured :   62.25 in(Converted to: 5 ft 2 in, 158.11 cm)    Body Mass Index :   30.19 kg/m2 (HI)    Body Surface Area :   1.82 m2   Systolic Blood Pressure :   148 mmHg (HI)    Diastolic Blood Pressure :   70 mmHg   Mean Arterial Pressure :   96 mmHg   Peripheral Pulse Rate :   69 bpm   BP Site :   Right arm   BP Method :   Manual   Temperature Tympanic :   98.0 DegF(Converted to: 36.7 DegC)    Oxygen Saturation :   98 %   Nereida Chairez LPN - 4/15/2021 10:02 AM CDT   Health Status   Allergies Verified? :   Yes   Medication History Verified? :   Yes   Medical History Verified? :   No   Pre-Visit Planning Status :   Completed   Tobacco Use? :   Former smoker   Nereida Chairez LPN - 4/15/2021 10:02 AM CDT   Meds / Allergies   (As Of: 4/15/2021 10:06:15 AM CDT)   Allergies (Active)   Mold  Estimated Onset Date:   Unspecified ; Created By:   Gilda Arango; Reaction Status:   Active ; Category:   Drug ; Substance:   Mold ; Type:   Allergy ; Updated By:   Gilda Arango; Reviewed Date:   12/28/2018 9:25 AM CST        Medication List   (As Of: 4/15/2021 10:06:15 AM CDT)   Prescription/Discharge Order    potassium chloride  :   potassium chloride ; Status:   Prescribed ; Ordered As Mnemonic:   potassium chloride 10 mEq oral capsule, extended release ; Simple Display Line:   1 cap(s), Oral, daily, 30 cap(s), 0 Refill(s) ; Ordering Provider:   Emmy Pa; Catalog Code:   potassium chloride ; Order Dt/Tm:   3/2/2021 2:10:16 PM CST          metoprolol  :   metoprolol ; Status:   Prescribed ; Ordered As Mnemonic:   Metoprolol Succinate  mg oral tablet, extended release ; Simple Display Line:   100 mg, 1 tab(s), Oral, daily, 90  tab(s), 3 Refill(s) ; Ordering Provider:   Emmy Pa; Catalog Code:   metoprolol ; Order Dt/Tm:   9/1/2020 9:04:55 AM CDT          hydroCHLOROthiazide  :   hydroCHLOROthiazide ; Status:   Prescribed ; Ordered As Mnemonic:   hydroCHLOROthiazide 25 mg oral tablet ; Simple Display Line:   25 mg, 1 tab(s), Oral, daily, 90 tab(s), 3 Refill(s) ; Ordering Provider:   Emmy Pa; Catalog Code:   hydroCHLOROthiazide ; Order Dt/Tm:   8/18/2020 12:15:47 PM CDT          amLODIPine  :   amLODIPine ; Status:   Prescribed ; Ordered As Mnemonic:   amLODIPine 10 mg oral tablet ; Simple Display Line:   1 tab(s), Oral, daily, 30 tab(s), 0 Refill(s) ; Ordering Provider:   Emmy Pa; Catalog Code:   amLODIPine ; Order Dt/Tm:   3/2/2021 2:10:15 PM CST          atorvastatin  :   atorvastatin ; Status:   Prescribed ; Ordered As Mnemonic:   atorvastatin 20 mg oral tablet ; Simple Display Line:   1 tab(s), Oral, daily, 90 tab(s), 3 Refill(s) ; Ordering Provider:   Emmy Pa; Catalog Code:   atorvastatin ; Order Dt/Tm:   8/18/2020 12:15:23 PM CDT          albuterol  :   albuterol ; Status:   Prescribed ; Ordered As Mnemonic:   albuterol 90 mcg/inh inhalation aerosol ; Simple Display Line:   2 puff(s), NEB, q4 hrs, PRN: AS NEEDED FOR SHORTNESS OF BREATH OR WHEEZING, 1 EA, 2 Refill(s) ; Ordering Provider:   Emmy Pa; Catalog Code:   albuterol ; Order Dt/Tm:   2/18/2020 9:09:28 AM CST          levothyroxine  :   levothyroxine ; Status:   Prescribed ; Ordered As Mnemonic:   levothyroxine 125 mcg (0.125 mg) oral tablet ; Simple Display Line:   See Instructions, TAKE 1 TABLET BY MOUTH EVERY DAY, 30 tab(s), 0 Refill(s) ; Ordering Provider:   Emmy Pa; Catalog Code:   levothyroxine ; Order Dt/Tm:   4/5/2021 12:34:50 PM CDT          valsartan  :   valsartan ; Status:   Prescribed ; Ordered As Mnemonic:   valsartan 80 mg oral tablet ; Simple Display Line:   See Instructions, TAKE 1 TABLET BY MOUTH  EVERY DAY, 30 tab(s), 0 Refill(s) ; Ordering Provider:   Emmy Pa; Catalog Code:   valsartan ; Order Dt/Tm:   4/5/2021 12:34:51 PM CDT            ID Risk Screen   Recent Travel History :   Last travel within 7 days   Family Member Travel History :   No recent travel   Other Exposure to Infectious Disease :   Unknown   COVID-19 Testing Status :   No COVID-19 test performed   Nereida Chairez LPN - 4/15/2021 10:02 AM CDT   Social History   Social History   (As Of: 4/15/2021 10:06:16 AM CDT)   Alcohol:        Wine, 1-2 times per week   (Last Updated: 2/22/2011 12:02:39 PM CST by Gilda Arango)          Tobacco:        Former smoker, quit more than 30 days ago   (Last Updated: 4/15/2021 10:03:41 AM CDT by Nereida Chairez LPN)          Electronic Cigarette/Vaping:        Electronic Cigarette Use: Never.   (Last Updated: 4/15/2021 10:03:44 AM CDT by Nereida Chairez LPN)          Substance Abuse:        Never   (Last Updated: 5/16/2012 8:12:04 AM CDT by Skylar Hoffman CMA)          Other:        Marital Status,    (Last Updated: 2/22/2011 12:01:25 PM CST by Gilda Arango)   Immunization:, Per paper chart patient completed Hepatitis B series in 2002. No dates. Not listed on WIR.   (Last Updated: 2/22/2011 12:18:39 PM CST by Nancy Pena)

## 2022-02-16 NOTE — TELEPHONE ENCOUNTER
Entered by April Lunsford CMA on January 04, 2020 2:35:47 PM CST  ---------------------  From: April Lunsford CMA   To: [facility]/pharmacy #[id number]    Sent: 1/4/2020 2:35:47 PM CST  Subject: Medication Management     ** Submitted: **  Order:hydroCHLOROthiazide (hydroCHLOROthiazide 25 mg oral tablet)  1 tab(s)  Oral  daily  Qty:  30 tab(s)        Refills:  0          Substitutions Allowed     Route To Pharmacy - [facility]/pharmacy #[id number]    Signed by April Lunsford CMA  1/4/2020 2:35:00 PM    ** Submitted: **  Complete:hydroCHLOROthiazide (hydroCHLOROthiazide 25 mg oral tablet)   Signed by April Lunsford CMA  1/4/2020 2:35:00 PM    ** Not Approved:  **  hydroCHLOROthiazide (HYDROCHLOROTHIAZIDE 25 MG TAB)  TAKE 1 TABLET BY MOUTH EVERY DAY  Qty:  90 tab(s)        Days Supply:  90        Refills:  3          Substitutions Allowed     Route To Pharmacy - [facility]/pharmacy #[id number]   Signed by April Lunsford CMA            ------------------------------------------  From: [facility]/pharmacy #[id number]  To: Emmy Pa  Sent: January 4, 2020 9:16:57 AM CST  Subject: Medication Management  Due: January 5, 2020 9:16:57 AM CST    ** On Hold Pending Signature **  Drug: hydroCHLOROthiazide (hydroCHLOROthiazide 25 mg oral tablet)  TAKE 1 TABLET BY MOUTH EVERY DAY  Quantity: 90 tab(s)  Days Supply: 90  Refills: 3  Substitutions Allowed  Notes from Pharmacy:     Dispensed Drug: hydroCHLOROthiazide (hydroCHLOROthiazide 25 mg oral tablet)  TAKE 1 TABLET BY MOUTH EVERY DAY  Quantity: 90 tab(s)  Days Supply: 90  Refills: 3  Substitutions Allowed  Notes from Pharmacy:   ------------------------------------------Med Refill      Date of last office visit and reason:  12/28/18; med check      Date of last Med Check / Px:   12/28/18  Date of last labs pertaining to med:  12/19/18    RTC order in chart:  yes; due now    For Protocol refill, has patient been contacted:  msg sent to pharmacy

## 2022-02-16 NOTE — NURSING NOTE
Comprehensive Intake Entered On:  11/10/2021 12:08 PM CST    Performed On:  11/10/2021 12:05 PM CST by Nereida Chairez LPN               Summary   Chief Complaint :   here for chronic disease visit, needing to get refills, would like to get her flu shot as well   Weight Measured :   162.9 lb(Converted to: 162 lb 14 oz, 73.890 kg)    Height Measured :   62.25 in(Converted to: 5 ft 2 in, 158.11 cm)    Body Mass Index :   29.55 kg/m2 (HI)    Body Surface Area :   1.8 m2   Systolic Blood Pressure :   168 mmHg (HI)    Diastolic Blood Pressure :   76 mmHg   Mean Arterial Pressure :   107 mmHg   Peripheral Pulse Rate :   67 bpm   BP Site :   Right arm   BP Method :   Manual   Temperature Tympanic :   98.1 DegF(Converted to: 36.7 DegC)    Oxygen Saturation :   98 %   Nereida Chairez LPN - 11/10/2021 12:05 PM CST   Health Status   Allergies Verified? :   Yes   Medication History Verified? :   Yes   Medical History Verified? :   No   Pre-Visit Planning Status :   Completed   Tobacco Use? :   Former smoker   Nereida Chairez LPN - 11/10/2021 12:05 PM CST   Meds / Allergies   (As Of: 11/10/2021 12:08:45 PM CST)   Allergies (Active)   Mold  Estimated Onset Date:   Unspecified ; Created By:   Gilda Arango; Reaction Status:   Active ; Category:   Drug ; Substance:   Mold ; Type:   Allergy ; Updated By:   Gilda Arango; Reviewed Date:   12/28/2018 9:25 AM CST        Medication List   (As Of: 11/10/2021 12:08:45 PM CST)   Prescription/Discharge Order    levothyroxine  :   levothyroxine ; Status:   Prescribed ; Ordered As Mnemonic:   levothyroxine 125 mcg (0.125 mg) oral tablet ; Simple Display Line:   See Instructions, TAKE 1 TABLET BY MOUTH EVERY DAY, 90 EA, 3 Refill(s) ; Ordering Provider:   Emmy Pa; Catalog Code:   levothyroxine ; Order Dt/Tm:   4/16/2021 12:08:14 PM CDT          valsartan  :   valsartan ; Status:   Prescribed ; Ordered As Mnemonic:   valsartan 80 mg oral tablet ; Simple Display Line:   See  Instructions, TAKE 1 TABLET BY MOUTH EVERY DAY, 90 EA, 1 Refill(s) ; Ordering Provider:   Emmy aP; Catalog Code:   valsartan ; Order Dt/Tm:   4/15/2021 10:21:17 AM CDT          albuterol  :   albuterol ; Status:   Prescribed ; Ordered As Mnemonic:   albuterol 90 mcg/inh inhalation aerosol ; Simple Display Line:   2 puff(s), NEB, q4 hrs, PRN: AS NEEDED FOR SHORTNESS OF BREATH OR WHEEZING, 1 EA, 2 Refill(s) ; Ordering Provider:   Emmy Pa; Catalog Code:   albuterol ; Order Dt/Tm:   4/15/2021 10:33:39 AM CDT          amLODIPine  :   amLODIPine ; Status:   Prescribed ; Ordered As Mnemonic:   amLODIPine 10 mg oral tablet ; Simple Display Line:   1 tab(s), Oral, daily, Appointment needed for further refills, 30 tab(s), 0 Refill(s) ; Ordering Provider:   Emmy Pa; Catalog Code:   amLODIPine ; Order Dt/Tm:   10/15/2021 10:18:03 AM CDT          atorvastatin  :   atorvastatin ; Status:   Prescribed ; Ordered As Mnemonic:   atorvastatin 20 mg oral tablet ; Simple Display Line:   1 tab(s), Oral, daily, 90 tab(s), 3 Refill(s) ; Ordering Provider:   Emmy Pa; Catalog Code:   atorvastatin ; Order Dt/Tm:   4/15/2021 10:33:11 AM CDT          hydroCHLOROthiazide  :   hydroCHLOROthiazide ; Status:   Prescribed ; Ordered As Mnemonic:   hydroCHLOROthiazide 25 mg oral tablet ; Simple Display Line:   25 mg, 1 tab(s), Oral, daily, 90 tab(s), 1 Refill(s) ; Ordering Provider:   Emmy Pa; Catalog Code:   hydroCHLOROthiazide ; Order Dt/Tm:   4/15/2021 10:33:24 AM CDT          metoprolol  :   metoprolol ; Status:   Prescribed ; Ordered As Mnemonic:   Metoprolol Succinate  mg oral tablet, extended release ; Simple Display Line:   100 mg, 1 tab(s), Oral, daily, Appointment needed for further refills, 30 tab(s), 0 Refill(s) ; Ordering Provider:   Emmy Pa; Catalog Code:   metoprolol ; Order Dt/Tm:   10/15/2021 10:18:38 AM CDT          potassium chloride  :   potassium chloride ;  Status:   Prescribed ; Ordered As Mnemonic:   potassium chloride 10 mEq oral capsule, extended release ; Simple Display Line:   1 cap(s), Oral, daily, 90 cap(s), 3 Refill(s) ; Ordering Provider:   Emmy Pa; Catalog Code:   potassium chloride ; Order Dt/Tm:   4/15/2021 10:32:43 AM CDT

## 2022-02-16 NOTE — TELEPHONE ENCOUNTER
Entered by Patricia Canada MA on February 07, 2021 10:32:23 AM CST  ---------------------  From: Patricia Canada MA   To: [facility] #[id number]    Sent: 2/7/2021 10:32:22 AM CST  Subject: Medication Management     ** Submitted: **  Order:potassium chloride (potassium chloride 10 mEq oral capsule, extended release)  1 cap(s)  Oral  daily  Qty:  30 cap(s)        Refills:  0          Substitutions Allowed     Route To [facility] #[id number]    Signed by Patricia Canada MA  2/7/2021 4:31:00 PM UT    ** Submitted: **  Complete:potassium chloride (potassium chloride 10 mEq oral capsule, extended release)   Signed by Patricia Canada MA  2/7/2021 4:32:00 PM UT    ** Not Approved:  **  potassium chloride (POTASSIUM CL ER 10 MEQ CAPSULE)  TAKE 1 CAPSULE BY MOUTH EVERY DAY  Qty:  90 cap(s)        Days Supply:  90        Refills:  3          Substitutions Allowed     Route To [facility] #[facility]   Signed by Patricia Canada MA            ** Submitted: **  Order:amLODIPine (amLODIPine 10 mg oral tablet)  1 tab(s)  Oral  daily  Qty:  30 tab(s)        Refills:  0          Substitutions Allowed     Route To [facility] #[id number]    Signed by Patricia Canada MA  2/7/2021 4:31:00 PM UT    ** Submitted: **  Complete:amLODIPine (amLODIPine 10 mg oral tablet)   Signed by Patricia Canada MA  2/7/2021 4:31:00 PM UT    ** Not Approved:  **  amLODIPine (AMLODIPINE BESYLATE 10 MG TAB)  TAKE 1 TABLET BY MOUTH EVERY DAY  Qty:  90 tab(s)        Days Supply:  90        Refills:  1          Substitutions Allowed     Route To [facility] #[id number]   Signed by Patricia Canada MA            ------------------------------------------  From: [facility] STORE [facility]  To: Emmy Pa  Sent: February 7, 2021 9:27:20 AM CST  Subject: Medication Management  Due: January 30, 2021 4:23:19 PM CST     ** On Hold Pending Signature **     Dispensed Drug: potassium chloride (potassium chloride 10 mEq oral capsule, extended  release), TAKE 1 CAPSULE BY MOUTH EVERY DAY  Quantity: 90 cap(s)  Days Supply: 90  Refills: 3  Substitutions Allowed  Notes from Pharmacy:     ** On Hold Pending Signature **     Dispensed Drug: amLODIPine (amLODIPine 10 mg oral tablet), TAKE 1 TABLET BY MOUTH EVERY DAY  Quantity: 90 tab(s)  Days Supply: 90  Refills: 1  Substitutions Allowed  Notes from Pharmacy:  ------------------------------------------Med Refill      Date of last office visit and reason:  8/18/2020 w/ NCB for CDM f/u      Date of last Med Check / Px:   _  Date of last labs pertaining to med:  8/18/2020    Note:  _    RTC order in chart:  RTC placed due now    For Protocol refill, has patient been contacted:  message sent to pharmacy

## 2022-02-16 NOTE — PROGRESS NOTES
Patient:   ZE CASTLE            MRN: [id number]            FIN: [id number]               Age:   61 years     Sex:  Female     :  1955   Associated Diagnoses:   Hypertension; Hypothyroidism; Asthma, Mild Intermittent   Author:   Emmy Pa      Visit Information      Date of Service: 2017 01:48 pm  Performing Location: [facility]  Encounter#: [id number]      Primary Care Provider (PCP):  [id number] -UNKNOWN,      Referring Provider:  Janell Song MD    NPI# [id number]      Chief Complaint   2017 1:51 PM CST   f/u labs/ RX refill      History of Present Illness   Patient presents for annual refill of medications and review of labs.  States her medication doses are fine and has no side effects.  Uses her prn albuterol about 3x/week when she is around allergens, but not daily.  ACT = 21.  Does not want to try any medication for elevated triglyercides at this time, wants to continue trying healthy eating.    No history of colonoscopy, states will schedule when has time  Last mammogram , again will schedule when has time      Review of Systems   Constitutional:  Negative.    Eye:  Negative.    Ear/Nose/Mouth/Throat:  Negative.    Respiratory:  Negative.    Cardiovascular:  Negative.    Gastrointestinal:  Negative.    Genitourinary:  Negative.    Gynecologic:  Negative.    Hematology/Lymphatics:  Negative.    Endocrine:  Negative.    Immunologic:  Negative.    Musculoskeletal:  Negative.    Integumentary:  Negative.    Neurologic:  Negative.    Psychiatric:  Negative.              Health Status   Allergies:    Allergic Reactions (Selected)  Severity Not Documented  Mold (No reactions were documented)   Problem list:    All Problems  Aortic valve calcification / SNOMED CT 761110823 / Confirmed  Asthma, Mild Intermittent / ICD-9-.90 / Confirmed  Hypothyroidism / SNOMED CT 178421461 / Confirmed  Ex-Smoker / ICD-9-CM V15.82 / Confirmed  Hypertension / SNOMED CT 6890446101  / Confirmed  Keratoacanthoma / SNOMED CT 778457976 / Confirmed  Obese / ICD-9-.00 / Probable  Osteopenia / ICD-9-.90 / Confirmed  Seasonal Allergies / ICD-9-.9 / Confirmed  Inactive: Eczema / ICD-9-.9  Inactive: Finger mass, left / ICD-9-.2  Inactive: Has received third dose of hepatitis B vaccine / SNOMED CT 7294572151  Inactive: Irritant contact dermatitis versus autoimmune process  Resolved: Mild persistent asthma / SNOMED CT 0437503986  Resolved: Rash, Skin / ICD-9-.1  Canceled: Asthma  Canceled: Eczema  Canceled: Eczema  Canceled: Osteopenia   Medications:  (Selected)   Prescriptions  Prescribed  Metoprolol Succinate  mg oral tablet, extended release: 1 tab(s) ( 100 mg ), po, daily, # 90 tab(s), 3 Refill(s), Type: Maintenance, Pharmacy: [facility]/pharmacy #[id number], 1 tab(s) po daily  ProAir HFA 90 mcg/inh inhalation aerosol: See Instructions, Instructions: INHALE 2 PUFFS BY MOUTH EVERY 4 HOURS AS NEEDED FOR SHORTNESS OF BREATH OR WHEEZING, # 1 EA, 1 Refill(s), Type: Maintenance, Pharmacy: [facility]/pharmacy #[id number], Due for appt for annual check/asthma check, INHALE 2 PUFFS BY M...  amLODIPine 10 mg oral tablet: 1 tab(s) ( 10 mg ), po, daily, # 90 tab(s), 3 Refill(s), Type: Maintenance, Pharmacy: [facility]/pharmacy #[id number], 1 tab(s) po daily  hydroCHLOROthiazide 25 mg oral tablet: 1 tab(s) ( 25 mg ), po, daily, # 90 tab(s), 3 Refill(s), Type: Maintenance, Pharmacy: [facility]/pharmacy #[id number], 1 tab(s) po daily  levothyroxine 125 mcg (0.125 mg) oral tablet: 1 tab(s) ( 125 mcg ), po, daily, # 90 tab(s), 3 Refill(s), Type: Maintenance, Pharmacy: [facility]/pharmacy #[id number], 1 tab(s) po daily  losartan 100 mg oral tablet: 1 tab(s) ( 100 mg ), po, daily, # 90 tab(s), 3 Refill(s), Type: Maintenance, Pharmacy: [facility]/pharmacy #[id number], 1 tab(s) po daily      Histories   Past Medical History:    Active  Keratoacanthoma (139793230): Onset in 2007 at 52 years.  Comments:  2/22/2011 CST 11:38 AM AMANDA - Gilda Arango  Not  definitive diagnosis, suspected, right arm.  Hypertension (1124392720)  Asthma, Mild Intermittent (493.90)  Ex-Smoker (V15.82)  Hypothyroidism (412553553)  Comments:  2011 CST 10:38 AM CST - Gilda Arango  Currently euthyroid  Seasonal Allergies (477.9)  Osteopenia (733.90)  Aortic valve calcification (579927085)  Resolved  Rash, Skin (782.1): Onset in the month of 2005 at 49 years  Resolved.  Mild persistent asthma (0544082218):  Resolved.   Family History:    Hypothyroid  Sister  Hypertension  Mother  Brother  Osteoporosis  Mother  Grandmother (M)     Procedure history:    Repair of shoulder (805550598) in  at 36 Years.  Comments:  2011 11:51 AM - Gilda Arango  Repair of left shoulder dislocation.  Childbirth (6757694952) in  at 21 Years.  Comments:  2011 11:46 AM - Gilda Arango  .  Toxemia at very end.  Tubal ligation (746706893).  Cardiac computed tomography for calcium scoring (8631320219).  Comments:  9/10/2014 9:51 AM - Cinthya Lopez CMA  Done  - results Zero  Do an Echo in 1-2 years because of mild degeneration in the aortic valve   Social History:        Alcohol Assessment: Current            Wine, 1-2 times per week      Tobacco Assessment: Past      Substance Abuse Assessment            Never      Exercise and Physical Activity Assessment: Regular exercise      Other Assessment            Immunization:, Per paper chart patient completed Hepatitis B series in . No dates. Not listed on WIR.            Marital Status,         Physical Examination   Vital Signs   2017 1:51 PM CST Temperature Tympanic 98.7 DegF    Peripheral Pulse Rate 62 bpm    Pulse Site Brachial artery    HR Method Electronic    Systolic Blood Pressure 138 mmHg    Diastolic Blood Pressure 70 mmHg    Mean Arterial Pressure 93 mmHg    BP Site Right arm    BP Method Electronic      Measurements from flowsheet : Measurements   2017 1:51 PM CST Height Measured - Standard 62.25 in    Weight  Measured - Standard 172.6 lb    BSA 1.85 m2    Body Mass Index 31.31 kg/m2      General:  Alert and oriented, No acute distress.    Eye:  Pupils are equal, round and reactive to light, Normal conjunctiva.    HENT:  Normocephalic, Oral mucosa is moist.    Neck:  Supple, Non-tender, No lymphadenopathy.    Respiratory:  Breath sounds are equal, Symmetrical chest wall expansion.         Respirations: Are within normal limits.         Pattern: Regular.         Breath sounds: Bilateral, Within normal limits.    Cardiovascular:  Normal rate, Regular rhythm, No murmur, Good pulses equal in all extremities, Normal peripheral perfusion, No edema.    Musculoskeletal:  Normal range of motion, Normal strength, Normal gait.    Integumentary:  Warm, Dry, No rash.    Neurologic:  Alert, Oriented, Normal sensory, Normal motor function.    Psychiatric:  Cooperative, Appropriate mood & affect, Normal judgment.       Health Maintenance   Immunization schedule:  received flu vaccine at work this year.       Review / Management   Results review      Impression and Plan   Diagnosis     Hypertension (LAF83-AN I10).     Hypothyroidism (ZPK81-WO E06.3).     Asthma, Mild Intermittent (KZF55-IX J45.20).     Patient Instructions:       Counseled: Patient, Regarding diagnosis, Regarding medications, Diet, Verbalized understanding.    Orders     Orders (Selected)   Prescriptions  Prescribed  Metoprolol Succinate  mg oral tablet, extended release: 1 tab(s) ( 100 mg ), po, daily, # 90 tab(s), 3 Refill(s), Type: Maintenance, Pharmacy: [facility]/pharmacy #[id number], 1 tab(s) po daily  ProAir HFA 90 mcg/inh inhalation aerosol: See Instructions, Instructions: INHALE 2 PUFFS BY MOUTH EVERY 4 HOURS AS NEEDED FOR SHORTNESS OF BREATH OR WHEEZING, # 1 EA, 1 Refill(s), Type: Maintenance, Pharmacy: [facility]/pharmacy #[id number], Due for appt for annual check/asthma check, INHALE 2 PUFFS BY M...  amLODIPine 10 mg oral tablet: 1 tab(s) ( 10 mg ), po, daily, # 90 tab(s), 3  Refill(s), Type: Maintenance, Pharmacy: [facility]/pharmacy #[id number], 1 tab(s) po daily  hydroCHLOROthiazide 25 mg oral tablet: 1 tab(s) ( 25 mg ), po, daily, # 90 tab(s), 3 Refill(s), Type: Maintenance, Pharmacy: [facility]/pharmacy #[id number], 1 tab(s) po daily  levothyroxine 125 mcg (0.125 mg) oral tablet: 1 tab(s) ( 125 mcg ), po, daily, # 90 tab(s), 3 Refill(s), Type: Maintenance, Pharmacy: [facility]/pharmacy #[id number], 1 tab(s) po daily  losartan 100 mg oral tablet: 1 tab(s) ( 100 mg ), po, daily, # 90 tab(s), 3 Refill(s), Type: Maintenance, Pharmacy: [facility]/pharmacy #[id number], 1 tab(s) po daily.

## 2022-03-02 NOTE — LETTER
(Inserted Image. Unable to display)                       January 12, 2022      ZE CASTLE  [address] [place] [id number]        Dear ZE,      Thank you for selecting [facility] for your healthcare needs.       This letter is to inform you that I have received a copy of your mammogram results.  Your results were NORMAL.  You will also receive notice of your results from the radiologist within 7-10 days.  This letter is to let you know I have also received a copy and it is filed in your clinic chart.    Please plan on having your next mammogram done in 12 months.  Thank you.          Please contact me or my assistant at [phone] if you have any questions or concerns.     Sincerely,        NANDINI Bacon-NP  Family Nurse Practitioner

## 2022-04-08 ENCOUNTER — TELEPHONE (OUTPATIENT)
Dept: FAMILY MEDICINE | Facility: CLINIC | Age: 67
End: 2022-04-08

## 2022-04-08 RX ORDER — AMLODIPINE BESYLATE 10 MG/1
10 TABLET ORAL DAILY
COMMUNITY
Start: 2021-11-10 | End: 2022-05-13

## 2022-04-08 RX ORDER — ALBUTEROL SULFATE 90 UG/1
2 AEROSOL, METERED RESPIRATORY (INHALATION) EVERY 4 HOURS PRN
COMMUNITY
Start: 2021-04-15 | End: 2022-09-08

## 2022-04-08 RX ORDER — LEVOTHYROXINE SODIUM 125 UG/1
125 TABLET ORAL DAILY
COMMUNITY
Start: 2021-04-16 | End: 2022-04-15

## 2022-04-08 RX ORDER — HYDROCHLOROTHIAZIDE 25 MG/1
25 TABLET ORAL DAILY
COMMUNITY
Start: 2021-11-10 | End: 2022-05-13

## 2022-04-08 RX ORDER — VALSARTAN 80 MG/1
80 TABLET ORAL DAILY
COMMUNITY
Start: 2021-11-10 | End: 2022-05-13

## 2022-04-08 RX ORDER — ATORVASTATIN CALCIUM 20 MG/1
20 TABLET, FILM COATED ORAL DAILY
COMMUNITY
Start: 2021-04-15 | End: 2022-05-13

## 2022-04-08 RX ORDER — POTASSIUM CHLORIDE 750 MG/1
10 CAPSULE, EXTENDED RELEASE ORAL DAILY
COMMUNITY
Start: 2021-04-15 | End: 2022-05-13

## 2022-04-14 DIAGNOSIS — E03.9 HYPOTHYROIDISM: Primary | ICD-10-CM

## 2022-04-15 ENCOUNTER — TELEPHONE (OUTPATIENT)
Dept: FAMILY MEDICINE | Facility: CLINIC | Age: 67
End: 2022-04-15

## 2022-04-15 RX ORDER — LEVOTHYROXINE SODIUM 125 UG/1
TABLET ORAL
Qty: 30 TABLET | Refills: 0 | Status: SHIPPED | OUTPATIENT
Start: 2022-04-15 | End: 2022-05-11

## 2022-04-15 NOTE — TELEPHONE ENCOUNTER
Reason for Call:  Medication or medication refill:    Do you use a [facility]?  Name of the pharmacy and phone number for the current request:  [place], [place]    Name of the medication requested: Levothyroxine    Other request: patient calling stated that the pharmacy did not receive prescription. Please resend. Patient is out of medication.     Can we leave a detailed message on this number? YES    Phone number patient can be reached at: Home number on file [phone] (home)    Best Time: anytime    Call taken on 4/15/2022 at 2:44 PM by Simona Marina

## 2022-04-15 NOTE — TELEPHONE ENCOUNTER
Prescription approved per [facility] Refill Protocol.    Jesse Zavala RN  [facility]

## 2022-04-15 NOTE — TELEPHONE ENCOUNTER
TC with [facility], they did not receive prescription. Verbal order given Levothyroxine 125mcg tab every day #30.  TC with pt, informed prescription refill has been sent.

## 2022-04-17 RX ORDER — LEVOTHYROXINE SODIUM 125 UG/1
125 TABLET ORAL DAILY
Qty: 30 TABLET | Refills: 0 | OUTPATIENT
Start: 2022-04-17

## 2022-04-17 NOTE — TELEPHONE ENCOUNTER
Request for levothyroxine refill received. Sent #30 on 4/15/22. Last TSH 4/15/21.  Please call patient to see when she can schedule follow up with Emmy Vences with TSH.

## 2022-05-08 DIAGNOSIS — E03.9 HYPOTHYROIDISM: ICD-10-CM

## 2022-05-11 RX ORDER — LEVOTHYROXINE SODIUM 125 UG/1
TABLET ORAL
Qty: 90 TABLET | Refills: 0 | Status: SHIPPED | OUTPATIENT
Start: 2022-05-11 | End: 2022-05-13

## 2022-05-11 NOTE — TELEPHONE ENCOUNTER
TC- please contact patient. 90 day supply of medication sent to pharmacy. Patient will need to schedule appointment for refills.      Last fill: 4/14/22  #30 R-0    Last visit: 4/15/21  Labs: 4/15/21

## 2022-05-13 ENCOUNTER — OFFICE VISIT (OUTPATIENT)
Dept: FAMILY MEDICINE | Facility: CLINIC | Age: 67
End: 2022-05-13
Payer: COMMERCIAL

## 2022-05-13 VITALS
OXYGEN SATURATION: 98 % | SYSTOLIC BLOOD PRESSURE: 164 MMHG | DIASTOLIC BLOOD PRESSURE: 62 MMHG | BODY MASS INDEX: 29.19 KG/M2 | HEART RATE: 68 BPM | WEIGHT: 160.9 LBS

## 2022-05-13 DIAGNOSIS — E03.9 HYPOTHYROIDISM, UNSPECIFIED TYPE: ICD-10-CM

## 2022-05-13 DIAGNOSIS — I10 PRIMARY HYPERTENSION: ICD-10-CM

## 2022-05-13 DIAGNOSIS — E78.5 HYPERLIPIDEMIA, UNSPECIFIED HYPERLIPIDEMIA TYPE: Primary | ICD-10-CM

## 2022-05-13 PROBLEM — E06.3 AUTOIMMUNE THYROIDITIS: Status: ACTIVE | Noted: 2022-05-13

## 2022-05-13 PROBLEM — J45.20 MILD INTERMITTENT ASTHMA: Status: ACTIVE | Noted: 2022-05-13

## 2022-05-13 PROBLEM — L85.8 KERATOACANTHOMA: Status: ACTIVE | Noted: 2022-05-13

## 2022-05-13 PROBLEM — E66.9 OBESITY: Status: ACTIVE | Noted: 2022-05-13

## 2022-05-13 PROBLEM — Z87.891 FORMER SMOKER: Status: ACTIVE | Noted: 2022-05-13

## 2022-05-13 PROBLEM — M85.80 OSTEOPENIA: Status: ACTIVE | Noted: 2022-05-13

## 2022-05-13 PROBLEM — J30.2 SEASONAL ALLERGIC RHINITIS: Status: ACTIVE | Noted: 2022-05-13

## 2022-05-13 PROBLEM — I35.9 CALCIFICATION OF AORTIC VALVE: Status: ACTIVE | Noted: 2022-05-13

## 2022-05-13 LAB
ANION GAP SERPL CALCULATED.3IONS-SCNC: 8 MMOL/L (ref 3–14)
BUN SERPL-MCNC: 15 MG/DL (ref 7–30)
CALCIUM SERPL-MCNC: 9.3 MG/DL (ref 8.5–10.1)
CHLORIDE BLD-SCNC: 105 MMOL/L (ref 94–109)
CHOLEST SERPL-MCNC: 208 MG/DL
CO2 SERPL-SCNC: 26 MMOL/L (ref 20–32)
CREAT SERPL-MCNC: 0.72 MG/DL (ref 0.52–1.04)
FASTING STATUS PATIENT QL REPORTED: ABNORMAL
GFR SERPL CREATININE-BSD FRML MDRD: >90 ML/MIN/1.73M2
GLUCOSE BLD-MCNC: 102 MG/DL (ref 70–99)
HDLC SERPL-MCNC: 92 MG/DL
LDLC SERPL CALC-MCNC: 81 MG/DL
NONHDLC SERPL-MCNC: 116 MG/DL
POTASSIUM BLD-SCNC: 4.3 MMOL/L (ref 3.4–5.3)
SODIUM SERPL-SCNC: 139 MMOL/L (ref 133–144)
TRIGL SERPL-MCNC: 173 MG/DL
TSH SERPL DL<=0.005 MIU/L-ACNC: 0.43 MU/L (ref 0.4–4)

## 2022-05-13 PROCEDURE — 99214 OFFICE O/P EST MOD 30 MIN: CPT | Performed by: NURSE PRACTITIONER

## 2022-05-13 PROCEDURE — 80048 BASIC METABOLIC PNL TOTAL CA: CPT | Performed by: NURSE PRACTITIONER

## 2022-05-13 PROCEDURE — 36415 COLL VENOUS BLD VENIPUNCTURE: CPT | Performed by: NURSE PRACTITIONER

## 2022-05-13 PROCEDURE — 80061 LIPID PANEL: CPT | Performed by: NURSE PRACTITIONER

## 2022-05-13 PROCEDURE — 84443 ASSAY THYROID STIM HORMONE: CPT | Performed by: NURSE PRACTITIONER

## 2022-05-13 RX ORDER — HYDROCHLOROTHIAZIDE 25 MG/1
25 TABLET ORAL DAILY
Qty: 90 TABLET | Refills: 1 | Status: SHIPPED | OUTPATIENT
Start: 2022-05-13 | End: 2022-11-17

## 2022-05-13 RX ORDER — LEVOTHYROXINE SODIUM 125 UG/1
125 TABLET ORAL DAILY
Qty: 90 TABLET | Refills: 1 | Status: SHIPPED | OUTPATIENT
Start: 2022-05-13 | End: 2022-05-14

## 2022-05-13 RX ORDER — ATORVASTATIN CALCIUM 20 MG/1
20 TABLET, FILM COATED ORAL DAILY
Qty: 90 TABLET | Refills: 1 | Status: SHIPPED | OUTPATIENT
Start: 2022-05-13 | End: 2022-06-17

## 2022-05-13 RX ORDER — VALSARTAN 80 MG/1
80 TABLET ORAL DAILY
Qty: 90 TABLET | Refills: 1 | Status: SHIPPED | OUTPATIENT
Start: 2022-05-13 | End: 2022-06-17

## 2022-05-13 RX ORDER — AMLODIPINE BESYLATE 10 MG/1
10 TABLET ORAL DAILY
Qty: 90 TABLET | Refills: 1 | Status: SHIPPED | OUTPATIENT
Start: 2022-05-13 | End: 2022-11-09

## 2022-05-13 RX ORDER — METOPROLOL SUCCINATE 100 MG/1
100 TABLET, EXTENDED RELEASE ORAL DAILY
COMMUNITY
Start: 2022-02-11 | End: 2022-05-13

## 2022-05-13 RX ORDER — METOPROLOL SUCCINATE 100 MG/1
100 TABLET, EXTENDED RELEASE ORAL DAILY
Qty: 90 TABLET | Refills: 1 | Status: SHIPPED | OUTPATIENT
Start: 2022-05-13 | End: 2022-11-09

## 2022-05-13 RX ORDER — POTASSIUM CHLORIDE 750 MG/1
10 CAPSULE, EXTENDED RELEASE ORAL DAILY
Qty: 90 CAPSULE | Refills: 1 | Status: SHIPPED | OUTPATIENT
Start: 2022-05-13 | End: 2022-11-09

## 2022-05-13 NOTE — PROGRESS NOTES
"  Assessment & Plan     Hypothyroidism, unspecified type    - levothyroxine (SYNTHROID/LEVOTHROID) 125 MCG tablet; Take 1 tablet (125 mcg) by mouth daily  - TSH with free T4 reflex; Future    Hyperlipidemia, unspecified hyperlipidemia type    - atorvastatin (LIPITOR) 20 MG tablet; Take 1 tablet (20 mg) by mouth daily  - Lipid panel reflex to direct LDL Fasting; Future    Primary hypertension  Poorly controlled.  Even her numbers from home are 140s systolic.  I asked her to see cardiology at being that it appears she is on a maximum dose of 4 agents and she agrees to this plan meds refilled and labs ordered today  - amLODIPine (NORVASC) 10 MG tablet; Take 1 tablet (10 mg) by mouth daily  - hydrochlorothiazide (HYDRODIURIL) 25 MG tablet; Take 1 tablet (25 mg) by mouth daily  - metoprolol succinate ER (TOPROL XL) 100 MG 24 hr tablet; Take 1 tablet (100 mg) by mouth daily  - valsartan (DIOVAN) 80 MG tablet; Take 1 tablet (80 mg) by mouth daily  - potassium chloride ER (MICRO-K) 10 MEQ CR capsule; Take 1 capsule (10 mEq) by mouth daily  - Basic metabolic panel; Future             BMI:   Estimated body mass index is 29.19 kg/m  as calculated from the following:    Height as of 11/10/21: 1.581 m (5' 2.25\").    Weight as of this encounter: 73 kg (160 lb 14.4 oz).           No follow-ups on file.    Emmy Vences NP  [facility]    Clarence Matthews is a 66 year old who presents for the following health issues     HPI patient is doing well and feels fine  Her blood pressure is not under good control.  Readings at home are about 148 systolically.  She is agreeable to seeing cardiology.  She has no sharp this of breath or chest pain.          Review of Systems         Objective    BP (!) 164/62   Pulse 68   Wt 73 kg (160 lb 14.4 oz)   SpO2 98%   BMI 29.19 kg/m    Body mass index is 29.19 kg/m .  Physical Exam   AOx3 NAD  herar RRR, lungs clear, no pedal edema                  "

## 2022-05-13 NOTE — LETTER
May 14, 2022      Cassie Pemberton  [address] [place] [id number]        Dear ,    We are writing to inform you of your test results.    Lab results look good . I sent refills of the levothyroxine to your pharmacy for the same dose. Schedule with cardiology as discussed to address blood pressure, Cassie. Thanks so much!    Resulted Orders   Basic metabolic panel   Result Value Ref Range    Sodium 139 133 - 144 mmol/L    Potassium 4.3 3.4 - 5.3 mmol/L    Chloride 105 94 - 109 mmol/L    Carbon Dioxide (CO2) 26 20 - 32 mmol/L    Anion Gap 8 3 - 14 mmol/L    Urea Nitrogen 15 7 - 30 mg/dL    Creatinine 0.72 0.52 - 1.04 mg/dL    Calcium 9.3 8.5 - 10.1 mg/dL    Glucose 102 (H) 70 - 99 mg/dL    GFR Estimate >90 >60 mL/min/1.73m2      Comment:      Effective December 21, 2021 eGFRcr in adults is calculated using the 2021 CKD-EPI creatinine equation which includes age and gender (Darnell emmanuel al., [place], DOI: 10.1056/HQSHzk4528379)   TSH with free T4 reflex   Result Value Ref Range    TSH 0.43 0.40 - 4.00 mU/L   Lipid panel reflex to direct LDL Fasting   Result Value Ref Range    Cholesterol 208 (H) <200 mg/dL    Triglycerides 173 (H) <150 mg/dL    Direct Measure HDL 92 >=50 mg/dL    LDL Cholesterol Calculated 81 <=100 mg/dL    Non HDL Cholesterol 116 <130 mg/dL    Patient Fasting > 8hrs? Unknown     Narrative    Cholesterol  Desirable:  <200 mg/dL    Triglycerides  Normal:  Less than 150 mg/dL  Borderline High:  150-199 mg/dL  High:  200-499 mg/dL  Very High:  Greater than or equal to 500 mg/dL    Direct Measure HDL  Female:  Greater than or equal to 50 mg/dL   Male:  Greater than or equal to 40 mg/dL    LDL Cholesterol  Desirable:  <100mg/dL  Above Desirable:  100-129 mg/dL   Borderline High:  130-159 mg/dL   High:  160-189 mg/dL   Very High:  >= 190 mg/dL    Non HDL Cholesterol  Desirable:  130 mg/dL  Above Desirable:  130-159 mg/dL  Borderline High:  160-189 mg/dL  High:  190-219 mg/dL  Very High:  Greater than  or equal to 220 mg/dL       If you have any questions or concerns, please call the clinic at the number listed above.       Sincerely,      Emmy Vences NP

## 2022-05-14 RX ORDER — LEVOTHYROXINE SODIUM 125 UG/1
125 TABLET ORAL DAILY
Qty: 90 TABLET | Refills: 3 | Status: SHIPPED | OUTPATIENT
Start: 2022-05-14 | End: 2023-08-08

## 2022-05-30 DIAGNOSIS — I10 PRIMARY HYPERTENSION: ICD-10-CM

## 2022-05-31 RX ORDER — VALSARTAN 80 MG/1
TABLET ORAL
Qty: 90 TABLET | Refills: 1 | OUTPATIENT
Start: 2022-05-31

## 2022-06-15 DIAGNOSIS — I10 PRIMARY HYPERTENSION: ICD-10-CM

## 2022-06-16 DIAGNOSIS — E78.5 HYPERLIPIDEMIA, UNSPECIFIED HYPERLIPIDEMIA TYPE: ICD-10-CM

## 2022-06-17 RX ORDER — ATORVASTATIN CALCIUM 20 MG/1
TABLET, FILM COATED ORAL
Qty: 90 TABLET | Refills: 0 | Status: SHIPPED | OUTPATIENT
Start: 2022-06-17 | End: 2022-11-17

## 2022-06-17 RX ORDER — VALSARTAN 80 MG/1
TABLET ORAL
Qty: 90 TABLET | Refills: 1 | Status: SHIPPED | OUTPATIENT
Start: 2022-06-17 | End: 2022-11-17

## 2022-06-17 NOTE — TELEPHONE ENCOUNTER
"Pharmacy in [place] requesting prescription, resent prescription.    Requested Prescriptions   Pending Prescriptions Disp Refills     atorvastatin (LIPITOR) 20 MG tablet [Pharmacy Med Name: ATORVASTATIN 20 MG TABLET] 90 tablet 1     Sig: TAKE 1 TABLET BY MOUTH EVERY DAY       Statins Protocol Passed - 6/16/2022 12:30 PM        Passed - LDL on file in past 12 months     Recent Labs   Lab Test 05/13/22  1231   LDL 81             Passed - No abnormal creatine kinase in past 12 months     No lab results found.             Passed - Recent (12 mo) or future (30 days) visit within the authorizing provider's specialty     Patient has had an office visit with the authorizing provider or a provider within the authorizing providers department within the previous 12 mos or has a future within next 30 days. See \"Patient Info\" tab in inbasket, or \"Choose Columns\" in Meds & Orders section of the refill encounter.              Passed - Medication is active on med list        Passed - Patient is age 18 or older        Passed - No active pregnancy on record        Passed - No positive pregnancy test in past 12 months             Aramis Zavala RN 06/17/22 1:47 PM  "

## 2022-09-08 ENCOUNTER — TELEPHONE (OUTPATIENT)
Dept: FAMILY MEDICINE | Facility: CLINIC | Age: 67
End: 2022-09-08

## 2022-09-08 DIAGNOSIS — J45.20 MILD INTERMITTENT ASTHMA: Primary | ICD-10-CM

## 2022-09-08 RX ORDER — ALBUTEROL SULFATE 90 UG/1
2 AEROSOL, METERED RESPIRATORY (INHALATION) EVERY 4 HOURS PRN
Qty: 18 G | Refills: 3 | Status: SHIPPED | OUTPATIENT
Start: 2022-09-08 | End: 2022-11-17

## 2022-09-08 NOTE — TELEPHONE ENCOUNTER
Prescription approved per [facility] Refill Protocol.    Last Written Prescription Date: 4/15/21   Last office visit: 5/13/2022 with prescribing provider

## 2022-10-03 ENCOUNTER — HEALTH MAINTENANCE LETTER (OUTPATIENT)
Age: 67
End: 2022-10-03

## 2022-11-09 DIAGNOSIS — I10 PRIMARY HYPERTENSION: ICD-10-CM

## 2022-11-09 RX ORDER — POTASSIUM CHLORIDE 750 MG/1
CAPSULE, EXTENDED RELEASE ORAL
Qty: 30 CAPSULE | Refills: 0 | Status: SHIPPED | OUTPATIENT
Start: 2022-11-09 | End: 2022-11-17

## 2022-11-09 NOTE — TELEPHONE ENCOUNTER
Last Written Prescription Date:  5/13/22  Last Fill Quantity: 90 days,  # refills: 1   Last office visit: 5/13/2022 with prescribing provider:  DULCE   Future Office Visit:  Due for 6 month follow up        Routing refill request to provider for review/approval because:            Approval needed for Metoprolol and Amlodipine. Both prepped with 30 day supply, 0 refills.    "[employer] message sent to patient to make appointment.    Kaur Pozo RN

## 2022-11-10 RX ORDER — METOPROLOL SUCCINATE 100 MG/1
TABLET, EXTENDED RELEASE ORAL
Qty: 30 TABLET | Refills: 0 | Status: SHIPPED | OUTPATIENT
Start: 2022-11-10 | End: 2022-11-17

## 2022-11-10 RX ORDER — AMLODIPINE BESYLATE 10 MG/1
TABLET ORAL
Qty: 30 TABLET | Refills: 0 | Status: SHIPPED | OUTPATIENT
Start: 2022-11-10 | End: 2022-11-17

## 2022-11-17 ENCOUNTER — OFFICE VISIT (OUTPATIENT)
Dept: FAMILY MEDICINE | Facility: CLINIC | Age: 67
End: 2022-11-17
Payer: COMMERCIAL

## 2022-11-17 VITALS
DIASTOLIC BLOOD PRESSURE: 82 MMHG | HEART RATE: 86 BPM | WEIGHT: 155.1 LBS | BODY MASS INDEX: 28.54 KG/M2 | HEIGHT: 62 IN | TEMPERATURE: 98.2 F | OXYGEN SATURATION: 99 % | SYSTOLIC BLOOD PRESSURE: 138 MMHG

## 2022-11-17 DIAGNOSIS — J45.20 MILD INTERMITTENT ASTHMA WITHOUT COMPLICATION: ICD-10-CM

## 2022-11-17 DIAGNOSIS — Z12.11 SCREEN FOR COLON CANCER: ICD-10-CM

## 2022-11-17 DIAGNOSIS — Z11.59 NEED FOR HEPATITIS C SCREENING TEST: ICD-10-CM

## 2022-11-17 DIAGNOSIS — I10 PRIMARY HYPERTENSION: Primary | ICD-10-CM

## 2022-11-17 DIAGNOSIS — E78.5 HYPERLIPIDEMIA, UNSPECIFIED HYPERLIPIDEMIA TYPE: ICD-10-CM

## 2022-11-17 PROCEDURE — G0008 ADMIN INFLUENZA VIRUS VAC: HCPCS | Performed by: NURSE PRACTITIONER

## 2022-11-17 PROCEDURE — 99214 OFFICE O/P EST MOD 30 MIN: CPT | Mod: 25 | Performed by: NURSE PRACTITIONER

## 2022-11-17 PROCEDURE — 90662 IIV NO PRSV INCREASED AG IM: CPT | Performed by: NURSE PRACTITIONER

## 2022-11-17 PROCEDURE — G0009 ADMIN PNEUMOCOCCAL VACCINE: HCPCS | Performed by: NURSE PRACTITIONER

## 2022-11-17 PROCEDURE — 90677 PCV20 VACCINE IM: CPT | Performed by: NURSE PRACTITIONER

## 2022-11-17 RX ORDER — HYDROCHLOROTHIAZIDE 25 MG/1
25 TABLET ORAL DAILY
Qty: 90 TABLET | Refills: 3 | Status: SHIPPED | OUTPATIENT
Start: 2022-11-17 | End: 2023-11-14

## 2022-11-17 RX ORDER — ALBUTEROL SULFATE 90 UG/1
2 AEROSOL, METERED RESPIRATORY (INHALATION) EVERY 4 HOURS PRN
Qty: 18 G | Refills: 3 | Status: SHIPPED | OUTPATIENT
Start: 2022-11-17 | End: 2023-11-14

## 2022-11-17 RX ORDER — METOPROLOL SUCCINATE 100 MG/1
100 TABLET, EXTENDED RELEASE ORAL DAILY
Qty: 90 TABLET | Refills: 3 | Status: SHIPPED | OUTPATIENT
Start: 2022-11-17 | End: 2023-11-14

## 2022-11-17 RX ORDER — LISINOPRIL 30 MG/1
30 TABLET ORAL DAILY
Qty: 90 TABLET | Refills: 4 | Status: SHIPPED | OUTPATIENT
Start: 2022-11-17 | End: 2023-11-14

## 2022-11-17 RX ORDER — POTASSIUM CHLORIDE 750 MG/1
10 CAPSULE, EXTENDED RELEASE ORAL DAILY
Qty: 90 CAPSULE | Refills: 3 | Status: SHIPPED | OUTPATIENT
Start: 2022-11-17 | End: 2023-11-14

## 2022-11-17 RX ORDER — ATORVASTATIN CALCIUM 20 MG/1
20 TABLET, FILM COATED ORAL DAILY
Qty: 90 TABLET | Refills: 3 | Status: SHIPPED | OUTPATIENT
Start: 2022-11-17 | End: 2023-11-14

## 2022-11-17 RX ORDER — AMLODIPINE BESYLATE 10 MG/1
10 TABLET ORAL DAILY
Qty: 90 TABLET | Refills: 3 | Status: SHIPPED | OUTPATIENT
Start: 2022-11-17 | End: 2023-11-14

## 2022-11-17 ASSESSMENT — ASTHMA QUESTIONNAIRES
QUESTION_3 LAST FOUR WEEKS HOW OFTEN DID YOUR ASTHMA SYMPTOMS (WHEEZING, COUGHING, SHORTNESS OF BREATH, CHEST TIGHTNESS OR PAIN) WAKE YOU UP AT NIGHT OR EARLIER THAN USUAL IN THE MORNING: NOT AT ALL
QUESTION_1 LAST FOUR WEEKS HOW MUCH OF THE TIME DID YOUR ASTHMA KEEP YOU FROM GETTING AS MUCH DONE AT WORK, SCHOOL OR AT HOME: NONE OF THE TIME
QUESTION_4 LAST FOUR WEEKS HOW OFTEN HAVE YOU USED YOUR RESCUE INHALER OR NEBULIZER MEDICATION (SUCH AS ALBUTEROL): ONCE A WEEK OR LESS
QUESTION_5 LAST FOUR WEEKS HOW WOULD YOU RATE YOUR ASTHMA CONTROL: WELL CONTROLLED
ACT_TOTALSCORE: 23
QUESTION_2 LAST FOUR WEEKS HOW OFTEN HAVE YOU HAD SHORTNESS OF BREATH: NOT AT ALL
ACT_TOTALSCORE: 23

## 2022-11-17 NOTE — LETTER
My Asthma Action Plan    Name: Cassie Pemberton   YOB: 1955  Date: 11/17/2022   My doctor: Emmy Vences NP   My clinic: [facility]        My Rescue Medicine:   Albuterol inhaler (Proair/Ventolin/Proventil HFA)  2-4 puffs EVERY 4 HOURS as needed. Use a spacer if recommended by your provider.   My Asthma Severity:   Intermittent / Exercise Induced  Know your asthma triggers: animal dander and mold             GREEN ZONE   Good Control    I feel good    No cough or wheeze    Can work, sleep and play without asthma symptoms       Take your asthma control medicine every day.     1. If exercise triggers your asthma, take your rescue medication    15 minutes before exercise or sports, and    During exercise if you have asthma symptoms  2. Spacer to use with inhaler: If you have a spacer, make sure to use it with your inhaler             YELLOW ZONE Getting Worse  I have ANY of these:    I do not feel good    Cough or wheeze    Chest feels tight    Wake up at night   1. Keep taking your Green Zone medications  2. Start taking your rescue medicine:    every 20 minutes for up to 1 hour. Then every 4 hours for 24-48 hours.  3. If you stay in the Yellow Zone for more than 12-24 hours, contact your doctor.  4. If you do not return to the Green Zone in 12-24 hours or you get worse, start taking your oral steroid medicine if prescribed by your provider.           RED ZONE Medical Alert - Get Help  I have ANY of these:    I feel awful    Medicine is not helping    Breathing getting harder    Trouble walking or talking    Nose opens wide to breathe       1. Take your rescue medicine NOW  2. If your provider has prescribed an oral steroid medicine, start taking it NOW  3. Call your doctor NOW  4. If you are still in the Red Zone after 20 minutes and you have not reached your doctor:    Take your rescue medicine again and    Call 911 or go to the emergency room right away    See your  regular doctor within 2 weeks of an Emergency Room or Urgent Care visit for follow-up treatment.          Annual Reminders:  Meet with Asthma Educator,  Flu Shot in the Fall, consider Pneumonia Vaccination for patients with asthma (aged 19 and older).    Pharmacy:    [facility] - [place], [place] - [address] [id number] - [place], [place] - [address] AT [facility], [facility] [address]    Electronically signed by Emmy Vences NP   Date: 11/17/22                    Asthma Triggers  How To Control Things That Make Your Asthma Worse    Triggers are things that make your asthma worse.  Look at the list below to help you find your triggers and   what you can do about them. You can help prevent asthma flare-ups by staying away from your triggers.      Trigger                                                          What you can do   Cigarette Smoke  Tobacco smoke can make asthma worse. Do not allow smoking in your home, car or around you.  Be sure no one smokes at a child s day care or school.  If you smoke, ask your health care provider for ways to help you quit.  Ask family members to quit too.  Ask your health care provider for a referral to Quit Plan to help you quit smoking, or call 5-913-716-PLAN.     Colds, Flu, Bronchitis  These are common triggers of asthma. Wash your hands often.  Don t touch your eyes, nose or mouth.  Get a flu shot every year.     Dust Mites  These are tiny bugs that live in cloth or carpet. They are too small to see. Wash sheets and blankets in hot water every week.   Encase pillows and mattress in dust mite proof covers.  Avoid having carpet if you can. If you have carpet, vacuum weekly.   Use a dust mask and HEPA vacuum.   Pollen and Outdoor Mold  Some people are allergic to trees, grass, or weed pollen, or molds. Try to keep your windows closed.  Limit time out doors when pollen  count is high.   Ask you health care provider about taking medicine during allergy season.     Animal Dander  Some people are allergic to skin flakes, urine or saliva from pets with fur or feathers. Keep pets with fur or feathers out of your home.    If you can t keep the pet outdoors, then keep the pet out of your bedroom.  Keep the bedroom door closed.  Keep pets off cloth furniture and away from stuffed toys.     Mice, Rats, and Cockroaches  Some people are allergic to the waste from these pests.   Cover food and garbage.  Clean up spills and food crumbs.  Store grease in the refrigerator.   Keep food out of the bedroom.   Indoor Mold  This can be a trigger if your home has high moisture. Fix leaking faucets, pipes, or other sources of water.   Clean moldy surfaces.  Dehumidify basement if it is damp and smelly.   Smoke, Strong Odors, and Sprays  These can reduce air quality. Stay away from strong odors and sprays, such as perfume, powder, hair spray, paints, smoke incense, paint, cleaning products, candles and new carpet.   Exercise or Sports  Some people with asthma have this trigger. Be active!  Ask your doctor about taking medicine before sports or exercise to prevent symptoms.    Warm up for 5-10 minutes before and after sports or exercise.     Other Triggers of Asthma  Cold air:  Cover your nose and mouth with a scarf.  Sometimes laughing or crying can be a trigger.  Some medicines and food can trigger asthma.

## 2022-11-17 NOTE — PROGRESS NOTES
Assessment & Plan       (I10) Primary hypertension  (primary encounter diagnosis)  Comment: I10) Primary hypertension  (primary encounter diagnosis)  Comment: Well-controlled.  She has made great efforts at dietary changes and weight loss and is frustrated that her systolic is still on the high 130s.  She has decided she does not want to follow with cardiology but instead she would like to go back to lisinopril.  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  She feels like the lisinopril worked better and little cough would not bother her now.  We will switch her sartan  Medication to an ACE inhibitor and she will monitor her blood pressure      Plan: lisinopril (ZESTRIL) 30 MG tablet, amLODIPine         (NORVASC) 10 MG tablet, hydrochlorothiazide         (HYDRODIURIL) 25 MG tablet, metoprolol         succinate ER (TOPROL XL) 100 MG 24 hr tablet,         potassium chloride ER (MICRO-K) 10 MEQ CR         capsule            (Z11.59) Need for hepatitis C screening test  Comment:   Plan: Hepatitis C Screen Reflex to HCV RNA Quant and         Genotype            (Z12.11) Screen for colon cancer  Comment:   Plan:     (J45.20) Mild intermittent asthma without complication  Comment: Well controlled see ACT and asthma action plan  Plan: albuterol (PROAIR HFA/PROVENTIL HFA/VENTOLIN         HFA) 108 (90 Base) MCG/ACT inhaler            (E78.5) Hyperlipidemia, unspecified hyperlipidemia type  Comment: Well-controlled  Plan: atorvastatin (LIPITOR) 20 MG tablet                Emmy Vences NP  [facility]    Clarence Matthews is a 66 year old, presenting for the following health issues:    Lost her  suddenly the perforated intestine.  She is a retired nurse.  She blames herself in some ways for how sick he got before it was recognized.  She is very frustrated  "with the medical system as he had seen multiple providers before dying and suffering in the intensive care for 3 weeks.  She has a good network of friends and support    She does not want to see cardiology.  She is lost weight and changed her diet and thinks that she could switch back to an ACE inhibitor and she would have better blood pressure control    Cholesterol is well controlled and she has adequate refills on her thyroid medication  Hypertension (Refill medication)      History of Present Illness       Hypertension: She presents for follow up of hypertension.  She does check blood pressure  regularly outside of the clinic. Outpatient blood pressures have not been over 140/90. She follows a low salt diet.       ACT Total Scores 11/17/2022   ACT TOTAL SCORE (Goal Greater than or Equal to 20) 23   In the past 12 months, how many times did you visit the emergency room for your asthma without being admitted to the hospital? 0   In the past 12 months, how many times were you hospitalized overnight because of your asthma? 0             Review of Systems         Objective    /82 (BP Location: Right arm)   Pulse 86   Temp 98.2  F (36.8  C) (Tympanic)   Ht 1.581 m (5' 2.25\")   Wt 70.4 kg (155 lb 1.6 oz)   SpO2 99%   BMI 28.14 kg/m    Body mass index is 28.14 kg/m .  Physical Exam     Alert and oriented tearful at times                      "

## 2023-08-07 DIAGNOSIS — E03.9 HYPOTHYROIDISM, UNSPECIFIED TYPE: ICD-10-CM

## 2023-08-07 DIAGNOSIS — E78.5 HYPERLIPIDEMIA, UNSPECIFIED HYPERLIPIDEMIA TYPE: ICD-10-CM

## 2023-08-07 DIAGNOSIS — I10 PRIMARY HYPERTENSION: Primary | ICD-10-CM

## 2023-08-07 NOTE — TELEPHONE ENCOUNTER
"Routing refill request to provider for review/approval because:  Labs not current:  last TSH 5/13/22    Last Written Prescription Date:  5/14/22  Last Fill Quantity: 90,  # refills: 3   Last office visit provider:  11/17/22     Requested Prescriptions   Pending Prescriptions Disp Refills    levothyroxine (SYNTHROID/LEVOTHROID) 125 MCG tablet [Pharmacy Med Name: LEVOTHYROXINE SOD 125MCG TAB] 90 tablet 3     Sig: TAKE ONE TABLET BY MOUTH EVERY DAY       Thyroid Protocol Failed - 8/7/2023  8:03 AM        Failed - Normal TSH on file in past 12 months     Recent Labs   Lab Test 05/13/22  1231   TSH 0.43              Passed - Patient is 12 years or older        Passed - Recent (12 mo) or future (30 days) visit within the authorizing provider's specialty     Patient has had an office visit with the authorizing provider or a provider within the authorizing providers department within the previous 12 mos or has a future within next 30 days. See \"Patient Info\" tab in inbasket, or \"Choose Columns\" in Meds & Orders section of the refill encounter.              Passed - Medication is active on med list        Passed - No active pregnancy on record     If patient is pregnant or has had a positive pregnancy test, please check TSH.          Passed - No positive pregnancy test in past 12 months     If patient is pregnant or has had a positive pregnancy test, please check TSH.               BILLIE HARP RN 08/07/23 12:50 PM  "

## 2023-08-08 RX ORDER — LEVOTHYROXINE SODIUM 125 UG/1
125 TABLET ORAL DAILY
Qty: 90 TABLET | Refills: 0 | Status: SHIPPED | OUTPATIENT
Start: 2023-08-08 | End: 2023-11-03

## 2023-08-08 NOTE — TELEPHONE ENCOUNTER
She is overdue for fasting labs and a med check  with me, please help her schedule both, I sent a short supply of levothyroxine thanks

## 2023-08-08 NOTE — TELEPHONE ENCOUNTER
Left a detailed vm informing patient of Emmy Vences request for fasting lab appointment and office visit f/u

## 2023-08-15 ENCOUNTER — LAB (OUTPATIENT)
Dept: LAB | Facility: CLINIC | Age: 68
End: 2023-08-15
Payer: COMMERCIAL

## 2023-08-15 DIAGNOSIS — Z11.59 NEED FOR HEPATITIS C SCREENING TEST: ICD-10-CM

## 2023-08-15 DIAGNOSIS — E03.9 HYPOTHYROIDISM, UNSPECIFIED TYPE: ICD-10-CM

## 2023-08-15 DIAGNOSIS — I10 PRIMARY HYPERTENSION: ICD-10-CM

## 2023-08-15 DIAGNOSIS — E78.5 HYPERLIPIDEMIA, UNSPECIFIED HYPERLIPIDEMIA TYPE: ICD-10-CM

## 2023-08-15 LAB
ANION GAP SERPL CALCULATED.3IONS-SCNC: 13 MMOL/L (ref 7–15)
BUN SERPL-MCNC: 8.9 MG/DL (ref 8–23)
CALCIUM SERPL-MCNC: 9.9 MG/DL (ref 8.8–10.2)
CHLORIDE SERPL-SCNC: 100 MMOL/L (ref 98–107)
CHOLEST SERPL-MCNC: 205 MG/DL
CREAT SERPL-MCNC: 0.74 MG/DL (ref 0.51–0.95)
DEPRECATED HCO3 PLAS-SCNC: 25 MMOL/L (ref 22–29)
GFR SERPL CREATININE-BSD FRML MDRD: 88 ML/MIN/1.73M2
GLUCOSE SERPL-MCNC: 109 MG/DL (ref 70–99)
HCV AB SERPL QL IA: NONREACTIVE
HDLC SERPL-MCNC: 83 MG/DL
LDLC SERPL CALC-MCNC: 97 MG/DL
NONHDLC SERPL-MCNC: 122 MG/DL
POTASSIUM SERPL-SCNC: 4.2 MMOL/L (ref 3.4–5.3)
SODIUM SERPL-SCNC: 138 MMOL/L (ref 136–145)
T4 FREE SERPL-MCNC: 2.19 NG/DL (ref 0.9–1.7)
TRIGL SERPL-MCNC: 123 MG/DL
TSH SERPL DL<=0.005 MIU/L-ACNC: 0.11 UIU/ML (ref 0.3–4.2)

## 2023-08-15 PROCEDURE — 84443 ASSAY THYROID STIM HORMONE: CPT

## 2023-08-15 PROCEDURE — 80048 BASIC METABOLIC PNL TOTAL CA: CPT

## 2023-08-15 PROCEDURE — 86803 HEPATITIS C AB TEST: CPT

## 2023-08-15 PROCEDURE — 36415 COLL VENOUS BLD VENIPUNCTURE: CPT

## 2023-08-15 PROCEDURE — 80061 LIPID PANEL: CPT

## 2023-08-15 PROCEDURE — 84439 ASSAY OF FREE THYROXINE: CPT

## 2023-10-22 ENCOUNTER — HEALTH MAINTENANCE LETTER (OUTPATIENT)
Age: 68
End: 2023-10-22

## 2023-11-03 DIAGNOSIS — E03.9 HYPOTHYROIDISM, UNSPECIFIED TYPE: ICD-10-CM

## 2023-11-03 RX ORDER — LEVOTHYROXINE SODIUM 125 UG/1
125 TABLET ORAL DAILY
Qty: 90 TABLET | Refills: 0 | Status: SHIPPED | OUTPATIENT
Start: 2023-11-03 | End: 2024-02-16

## 2023-11-03 NOTE — TELEPHONE ENCOUNTER
Left message on personalized VM letting patient know that she is due for office appt for med check and labs. Asked that she call back to schedule.

## 2023-11-16 ENCOUNTER — OFFICE VISIT (OUTPATIENT)
Dept: FAMILY MEDICINE | Facility: CLINIC | Age: 68
End: 2023-11-16
Payer: COMMERCIAL

## 2023-11-16 VITALS
HEIGHT: 62 IN | HEART RATE: 78 BPM | BODY MASS INDEX: 28.89 KG/M2 | TEMPERATURE: 97.8 F | WEIGHT: 157 LBS | RESPIRATION RATE: 16 BRPM | OXYGEN SATURATION: 98 % | SYSTOLIC BLOOD PRESSURE: 138 MMHG | DIASTOLIC BLOOD PRESSURE: 72 MMHG

## 2023-11-16 DIAGNOSIS — I10 PRIMARY HYPERTENSION: ICD-10-CM

## 2023-11-16 DIAGNOSIS — M85.80 OSTEOPENIA, UNSPECIFIED LOCATION: Primary | ICD-10-CM

## 2023-11-16 DIAGNOSIS — Z78.0 ASYMPTOMATIC MENOPAUSE: ICD-10-CM

## 2023-11-16 DIAGNOSIS — J45.20 MILD INTERMITTENT ASTHMA WITHOUT COMPLICATION: ICD-10-CM

## 2023-11-16 DIAGNOSIS — E03.9 HYPOTHYROIDISM, UNSPECIFIED TYPE: ICD-10-CM

## 2023-11-16 DIAGNOSIS — E78.5 HYPERLIPIDEMIA, UNSPECIFIED HYPERLIPIDEMIA TYPE: ICD-10-CM

## 2023-11-16 LAB
T4 FREE SERPL-MCNC: 2.15 NG/DL (ref 0.9–1.7)
TSH SERPL DL<=0.005 MIU/L-ACNC: 0.06 UIU/ML (ref 0.3–4.2)

## 2023-11-16 PROCEDURE — 99214 OFFICE O/P EST MOD 30 MIN: CPT | Performed by: NURSE PRACTITIONER

## 2023-11-16 PROCEDURE — 84439 ASSAY OF FREE THYROXINE: CPT | Performed by: NURSE PRACTITIONER

## 2023-11-16 PROCEDURE — 84443 ASSAY THYROID STIM HORMONE: CPT | Performed by: NURSE PRACTITIONER

## 2023-11-16 PROCEDURE — 36415 COLL VENOUS BLD VENIPUNCTURE: CPT | Performed by: NURSE PRACTITIONER

## 2023-11-16 RX ORDER — ALBUTEROL SULFATE 90 UG/1
2 AEROSOL, METERED RESPIRATORY (INHALATION) EVERY 4 HOURS PRN
Qty: 18 G | Refills: 3 | Status: SHIPPED | OUTPATIENT
Start: 2023-11-16

## 2023-11-16 RX ORDER — LEVOTHYROXINE SODIUM 125 UG/1
125 TABLET ORAL DAILY
Qty: 90 TABLET | Refills: 0 | Status: CANCELLED | OUTPATIENT
Start: 2023-11-16

## 2023-11-16 RX ORDER — POTASSIUM CHLORIDE 750 MG/1
10 CAPSULE, EXTENDED RELEASE ORAL DAILY
Qty: 90 CAPSULE | Refills: 3 | Status: SHIPPED | OUTPATIENT
Start: 2023-11-16

## 2023-11-16 RX ORDER — HYDROCHLOROTHIAZIDE 25 MG/1
25 TABLET ORAL DAILY
Qty: 90 TABLET | Refills: 3 | Status: SHIPPED | OUTPATIENT
Start: 2023-11-16

## 2023-11-16 RX ORDER — ATORVASTATIN CALCIUM 20 MG/1
20 TABLET, FILM COATED ORAL DAILY
Qty: 90 TABLET | Refills: 3 | Status: SHIPPED | OUTPATIENT
Start: 2023-11-16

## 2023-11-16 RX ORDER — METOPROLOL SUCCINATE 100 MG/1
100 TABLET, EXTENDED RELEASE ORAL DAILY
Qty: 90 TABLET | Refills: 3 | Status: SHIPPED | OUTPATIENT
Start: 2023-11-16

## 2023-11-16 RX ORDER — AMLODIPINE BESYLATE 10 MG/1
10 TABLET ORAL DAILY
Qty: 90 TABLET | Refills: 3 | Status: SHIPPED | OUTPATIENT
Start: 2023-11-16

## 2023-11-16 RX ORDER — RESPIRATORY SYNCYTIAL VIRUS VACCINE 120MCG/0.5
0.5 KIT INTRAMUSCULAR ONCE
Qty: 1 EACH | Refills: 0 | Status: CANCELLED | OUTPATIENT
Start: 2023-11-16 | End: 2023-11-16

## 2023-11-16 RX ORDER — LISINOPRIL 30 MG/1
30 TABLET ORAL DAILY
Qty: 90 TABLET | Refills: 4 | Status: SHIPPED | OUTPATIENT
Start: 2023-11-16

## 2023-11-16 ASSESSMENT — ASTHMA QUESTIONNAIRES: ACT_TOTALSCORE: 24

## 2023-11-16 NOTE — PROGRESS NOTES
"  Assessment & Plan     (M85.80) Osteopenia, unspecified location  (primary encounter diagnosis)  Comment:   Plan: DEXA HIP/PELVIS/SPINE - Future            (J45.20) Mild intermittent asthma without complication  Comment:   Plan: albuterol (PROAIR HFA/PROVENTIL HFA/VENTOLIN         HFA) 108 (90 Base) MCG/ACT inhaler            (I10) Primary hypertension  Comment:   Plan: amLODIPine (NORVASC) 10 MG tablet,         hydrochlorothiazide (HYDRODIURIL) 25 MG tablet,        lisinopril (ZESTRIL) 30 MG tablet, metoprolol         succinate ER (TOPROL XL) 100 MG 24 hr tablet,         potassium chloride ER (MICRO-K) 10 MEQ CR         capsule            (E78.5) Hyperlipidemia, unspecified hyperlipidemia type  Comment:   Plan: atorvastatin (LIPITOR) 20 MG tablet            (E03.9) Hypothyroidism, unspecified type  Comment:   Plan: TSH with free T4 reflex, T4 free            (Z78.0) Asymptomatic menopause  Comment:   Plan: DEXA HIP/PELVIS/SPINE - Future, REVIEW OF         HEALTH MAINTENANCE PROTOCOL ORDERS        Chronic disease stable an controlled  Set up for bone density screening             BMI:   Estimated body mass index is 28.49 kg/m  as calculated from the following:    Height as of this encounter: 1.581 m (5' 2.25\").    Weight as of this encounter: 71.2 kg (157 lb).           Emmy Vences NP  [facility]    Clarence Matthews is a 67 year old, presenting for the following health issues:  Needs her meds refilled, offered some preventative care catch up today but she is tired of being told what to do in health care and she declines everything except would like bone density test. Her mother has osteoporosis.    HTN--bp controlled but borderline  Lipids--controlled  Thyroid--abnormal test 3 months ago and dose of levothyroxine left unchanged. Would like to recheck today.  Asthma is well controlled. She has never been told she has COPD  Medication Refill        11/16/2023    11:12 AM " "  Additional Questions   Roomed by Laura       Medication Refill    History of Present Illness       Reason for visit:  Med check    She eats 4 or more servings of fruits and vegetables daily.She consumes 0 sweetened beverage(s) daily.She exercises with enough effort to increase her heart rate 20 to 29 minutes per day.  She exercises with enough effort to increase her heart rate 4 days per week.   She is taking medications regularly.         Asthma Follow-Up    Was ACT completed today?  Yes        11/16/2023    11:08 AM   ACT Total Scores   ACT TOTAL SCORE (Goal Greater than or Equal to 20) 24   In the past 12 months, how many times did you visit the emergency room for your asthma without being admitted to the hospital? 0   In the past 12 months, how many times were you hospitalized overnight because of your asthma? 0              Review of Systems         Objective    /72 (BP Location: Right arm, Patient Position: Sitting, Cuff Size: Adult Large)   Pulse 78   Temp 97.8  F (36.6  C) (Tympanic)   Resp 16   Ht 1.581 m (5' 2.25\")   Wt 71.2 kg (157 lb)   SpO2 98%   BMI 28.49 kg/m    Body mass index is 28.49 kg/m .  Physical Exam   GENERAL: healthy, alert and no distress  NECK: no adenopathy, no asymmetry, masses, or scars and thyroid normal to palpation  RESP: lungs clear to auscultation - no rales, rhonchi or wheezes  CV: regular rate and rhythm, normal S1 S2, no S3 or S4, no murmur, click or rub, no peripheral edema and peripheral pulses strong  MS: no gross musculoskeletal defects noted, no edema                      "

## 2023-11-17 RX ORDER — LEVOTHYROXINE SODIUM 112 UG/1
112 TABLET ORAL DAILY
Qty: 90 TABLET | Refills: 0 | Status: SHIPPED | OUTPATIENT
Start: 2023-11-17 | End: 2024-02-12

## 2024-02-10 DIAGNOSIS — E03.9 HYPOTHYROIDISM, UNSPECIFIED TYPE: Primary | ICD-10-CM

## 2024-02-12 RX ORDER — LEVOTHYROXINE SODIUM 112 UG/1
112 TABLET ORAL DAILY
Qty: 45 TABLET | Refills: 0 | Status: SHIPPED | OUTPATIENT
Start: 2024-02-12

## 2024-02-12 NOTE — TELEPHONE ENCOUNTER
Left message on personalized VM letting patient know that she is due to have her TSH level checked again as the last level was out of preferred range. Asked that she schedule lab appt so adjustments can be made to her Levothyroxine.

## 2024-02-15 ENCOUNTER — LAB (OUTPATIENT)
Dept: LAB | Facility: CLINIC | Age: 69
End: 2024-02-15
Payer: COMMERCIAL

## 2024-02-15 DIAGNOSIS — E03.9 HYPOTHYROIDISM, UNSPECIFIED TYPE: ICD-10-CM

## 2024-02-15 LAB — TSH SERPL DL<=0.005 MIU/L-ACNC: 0.51 UIU/ML (ref 0.3–4.2)

## 2024-02-15 PROCEDURE — 84443 ASSAY THYROID STIM HORMONE: CPT

## 2024-02-15 PROCEDURE — 36415 COLL VENOUS BLD VENIPUNCTURE: CPT

## 2024-02-16 RX ORDER — LEVOTHYROXINE SODIUM 125 UG/1
TABLET ORAL
Qty: 45 TABLET | Refills: 4 | Status: SHIPPED | OUTPATIENT
Start: 2024-02-16

## 2024-02-16 RX ORDER — LEVOTHYROXINE SODIUM 112 UG/1
TABLET ORAL
Qty: 45 TABLET | Refills: 4 | Status: SHIPPED | OUTPATIENT
Start: 2024-02-16

## 2024-11-25 ENCOUNTER — PATIENT OUTREACH (OUTPATIENT)
Dept: FAMILY MEDICINE | Facility: CLINIC | Age: 69
End: 2024-11-25
Payer: COMMERCIAL

## 2024-11-25 NOTE — TELEPHONE ENCOUNTER
Patient Quality Outreach    Patient is due for the following:   Physical Annual Wellness Visit    Action(s) Taken:   Schedule a Annual Wellness Visit    Type of outreach:    Sent [employer] message.    Questions for provider review:    None           Nereida Chairez LPN

## 2024-11-26 DIAGNOSIS — I10 PRIMARY HYPERTENSION: ICD-10-CM

## 2024-11-26 RX ORDER — HYDROCHLOROTHIAZIDE 25 MG/1
25 TABLET ORAL DAILY
Qty: 90 TABLET | Refills: 0 | Status: SHIPPED | OUTPATIENT
Start: 2024-11-26

## 2024-12-14 ENCOUNTER — HEALTH MAINTENANCE LETTER (OUTPATIENT)
Age: 69
End: 2024-12-14

## 2024-12-19 DIAGNOSIS — I10 PRIMARY HYPERTENSION: ICD-10-CM

## 2024-12-19 DIAGNOSIS — E78.5 HYPERLIPIDEMIA, UNSPECIFIED HYPERLIPIDEMIA TYPE: ICD-10-CM

## 2024-12-19 RX ORDER — AMLODIPINE BESYLATE 10 MG/1
10 TABLET ORAL DAILY
Qty: 90 TABLET | Refills: 3 | Status: SHIPPED | OUTPATIENT
Start: 2024-12-19

## 2024-12-19 RX ORDER — POTASSIUM CHLORIDE 750 MG/1
10 CAPSULE, EXTENDED RELEASE ORAL DAILY
Qty: 90 CAPSULE | Refills: 3 | Status: SHIPPED | OUTPATIENT
Start: 2024-12-19

## 2024-12-19 RX ORDER — METOPROLOL SUCCINATE 100 MG/1
100 TABLET, EXTENDED RELEASE ORAL DAILY
Qty: 90 TABLET | Refills: 3 | Status: SHIPPED | OUTPATIENT
Start: 2024-12-19

## 2024-12-19 RX ORDER — ATORVASTATIN CALCIUM 20 MG/1
20 TABLET, FILM COATED ORAL DAILY
Qty: 90 TABLET | Refills: 3 | Status: SHIPPED | OUTPATIENT
Start: 2024-12-19

## 2025-01-07 ENCOUNTER — ORDERS ONLY (AUTO-RELEASED) (OUTPATIENT)
Dept: FAMILY MEDICINE | Facility: CLINIC | Age: 70
End: 2025-01-07

## 2025-01-07 ENCOUNTER — OFFICE VISIT (OUTPATIENT)
Dept: FAMILY MEDICINE | Facility: CLINIC | Age: 70
End: 2025-01-07
Attending: NURSE PRACTITIONER
Payer: COMMERCIAL

## 2025-01-07 VITALS
HEART RATE: 61 BPM | TEMPERATURE: 98 F | OXYGEN SATURATION: 98 % | SYSTOLIC BLOOD PRESSURE: 137 MMHG | RESPIRATION RATE: 12 BRPM | WEIGHT: 162.9 LBS | HEIGHT: 63 IN | BODY MASS INDEX: 28.86 KG/M2 | DIASTOLIC BLOOD PRESSURE: 65 MMHG

## 2025-01-07 DIAGNOSIS — Z12.11 SCREEN FOR COLON CANCER: ICD-10-CM

## 2025-01-07 DIAGNOSIS — Z12.11 SCREEN FOR COLON CANCER: Primary | ICD-10-CM

## 2025-01-07 DIAGNOSIS — E03.9 HYPOTHYROIDISM, UNSPECIFIED TYPE: ICD-10-CM

## 2025-01-07 DIAGNOSIS — E78.5 HYPERLIPIDEMIA, UNSPECIFIED HYPERLIPIDEMIA TYPE: ICD-10-CM

## 2025-01-07 DIAGNOSIS — M85.80 OSTEOPENIA, UNSPECIFIED LOCATION: ICD-10-CM

## 2025-01-07 DIAGNOSIS — Z78.0 ASYMPTOMATIC MENOPAUSE: ICD-10-CM

## 2025-01-07 DIAGNOSIS — I10 PRIMARY HYPERTENSION: ICD-10-CM

## 2025-01-07 DIAGNOSIS — J45.20 MILD INTERMITTENT ASTHMA WITHOUT COMPLICATION: ICD-10-CM

## 2025-01-07 DIAGNOSIS — Z00.00 ENCOUNTER FOR MEDICARE ANNUAL WELLNESS EXAM: ICD-10-CM

## 2025-01-07 LAB
ANION GAP SERPL CALCULATED.3IONS-SCNC: 13 MMOL/L (ref 7–15)
BUN SERPL-MCNC: 14.4 MG/DL (ref 8–23)
CALCIUM SERPL-MCNC: 9.7 MG/DL (ref 8.8–10.4)
CHLORIDE SERPL-SCNC: 100 MMOL/L (ref 98–107)
CHOLEST SERPL-MCNC: 209 MG/DL
CREAT SERPL-MCNC: 0.87 MG/DL (ref 0.51–0.95)
EGFRCR SERPLBLD CKD-EPI 2021: 72 ML/MIN/1.73M2
FASTING STATUS PATIENT QL REPORTED: NO
FASTING STATUS PATIENT QL REPORTED: NO
GLUCOSE SERPL-MCNC: 105 MG/DL (ref 70–99)
HCO3 SERPL-SCNC: 24 MMOL/L (ref 22–29)
HDLC SERPL-MCNC: 71 MG/DL
LDLC SERPL CALC-MCNC: 72 MG/DL
NONHDLC SERPL-MCNC: 138 MG/DL
POTASSIUM SERPL-SCNC: 4.5 MMOL/L (ref 3.4–5.3)
SODIUM SERPL-SCNC: 137 MMOL/L (ref 135–145)
TRIGL SERPL-MCNC: 331 MG/DL
TSH SERPL DL<=0.005 MIU/L-ACNC: 0.9 UIU/ML (ref 0.3–4.2)

## 2025-01-07 PROCEDURE — G2211 COMPLEX E/M VISIT ADD ON: HCPCS | Performed by: NURSE PRACTITIONER

## 2025-01-07 PROCEDURE — G0438 PPPS, INITIAL VISIT: HCPCS | Performed by: NURSE PRACTITIONER

## 2025-01-07 PROCEDURE — 84443 ASSAY THYROID STIM HORMONE: CPT | Performed by: NURSE PRACTITIONER

## 2025-01-07 PROCEDURE — 80061 LIPID PANEL: CPT | Performed by: NURSE PRACTITIONER

## 2025-01-07 PROCEDURE — 80048 BASIC METABOLIC PNL TOTAL CA: CPT | Performed by: NURSE PRACTITIONER

## 2025-01-07 PROCEDURE — 99214 OFFICE O/P EST MOD 30 MIN: CPT | Mod: 25 | Performed by: NURSE PRACTITIONER

## 2025-01-07 PROCEDURE — 36415 COLL VENOUS BLD VENIPUNCTURE: CPT | Performed by: NURSE PRACTITIONER

## 2025-01-07 RX ORDER — LEVOTHYROXINE SODIUM 112 UG/1
TABLET ORAL
Qty: 45 TABLET | Refills: 4 | Status: CANCELLED | OUTPATIENT
Start: 2025-01-07

## 2025-01-07 RX ORDER — LEVOTHYROXINE SODIUM 125 UG/1
TABLET ORAL
Qty: 45 TABLET | Refills: 4 | Status: CANCELLED | OUTPATIENT
Start: 2025-01-07

## 2025-01-07 RX ORDER — LISINOPRIL 30 MG/1
30 TABLET ORAL DAILY
Qty: 90 TABLET | Refills: 4 | Status: SHIPPED | OUTPATIENT
Start: 2025-01-07

## 2025-01-07 RX ORDER — ALBUTEROL SULFATE 90 UG/1
2 INHALANT RESPIRATORY (INHALATION) EVERY 4 HOURS PRN
Qty: 18 G | Refills: 3 | Status: SHIPPED | OUTPATIENT
Start: 2025-01-07

## 2025-01-07 RX ORDER — HYDROCHLOROTHIAZIDE 25 MG/1
25 TABLET ORAL DAILY
Qty: 90 TABLET | Refills: 3 | Status: SHIPPED | OUTPATIENT
Start: 2025-01-07

## 2025-01-07 SDOH — HEALTH STABILITY: PHYSICAL HEALTH: ON AVERAGE, HOW MANY DAYS PER WEEK DO YOU ENGAGE IN MODERATE TO STRENUOUS EXERCISE (LIKE A BRISK WALK)?: 5 DAYS

## 2025-01-07 ASSESSMENT — ASTHMA QUESTIONNAIRES
QUESTION_3 LAST FOUR WEEKS HOW OFTEN DID YOUR ASTHMA SYMPTOMS (WHEEZING, COUGHING, SHORTNESS OF BREATH, CHEST TIGHTNESS OR PAIN) WAKE YOU UP AT NIGHT OR EARLIER THAN USUAL IN THE MORNING: NOT AT ALL
QUESTION_1 LAST FOUR WEEKS HOW MUCH OF THE TIME DID YOUR ASTHMA KEEP YOU FROM GETTING AS MUCH DONE AT WORK, SCHOOL OR AT HOME: NONE OF THE TIME
QUESTION_4 LAST FOUR WEEKS HOW OFTEN HAVE YOU USED YOUR RESCUE INHALER OR NEBULIZER MEDICATION (SUCH AS ALBUTEROL): ONCE A WEEK OR LESS
QUESTION_2 LAST FOUR WEEKS HOW OFTEN HAVE YOU HAD SHORTNESS OF BREATH: NOT AT ALL
ACT_TOTALSCORE: 23
QUESTION_5 LAST FOUR WEEKS HOW WOULD YOU RATE YOUR ASTHMA CONTROL: WELL CONTROLLED
ACT_TOTALSCORE: 23

## 2025-01-07 ASSESSMENT — SOCIAL DETERMINANTS OF HEALTH (SDOH): HOW OFTEN DO YOU GET TOGETHER WITH FRIENDS OR RELATIVES?: MORE THAN THREE TIMES A WEEK

## 2025-01-07 NOTE — PATIENT INSTRUCTIONS
Patient Education   Preventive Care Advice   This is general advice given by our system to help you stay healthy. However, your care team may have specific advice just for you. Please talk to your care team about your preventive care needs.  Nutrition  Eat 5 or more servings of fruits and vegetables each day.  Try wheat bread, brown rice and whole grain pasta (instead of white bread, rice, and pasta).  Get enough calcium and vitamin D. Check the label on foods and aim for 100% of the RDA (recommended daily allowance).  Lifestyle  Exercise at least 150 minutes each week  (30 minutes a day, 5 days a week).  Do muscle strengthening activities 2 days a week. These help control your weight and prevent disease.  No smoking.  Wear sunscreen to prevent skin cancer.  Have a dental exam and cleaning every 6 months.  Yearly exams  See your health care team every year to talk about:  Any changes in your health.  Any medicines your care team has prescribed.  Preventive care, family planning, and ways to prevent chronic diseases.  Shots (vaccines)   HPV shots (up to age 26), if you've never had them before.  Hepatitis B shots (up to age 59), if you've never had them before.  COVID-19 shot: Get this shot when it's due.  Flu shot: Get a flu shot every year.  Tetanus shot: Get a tetanus shot every 10 years.  Pneumococcal, hepatitis A, and RSV shots: Ask your care team if you need these based on your risk.  Shingles shot (for age 50 and up)  General health tests  Diabetes screening:  Starting at age 35, Get screened for diabetes at least every 3 years.  If you are younger than age 35, ask your care team if you should be screened for diabetes.  Cholesterol test: At age 39, start having a cholesterol test every 5 years, or more often if advised.  Bone density scan (DEXA): At age 50, ask your care team if you should have this scan for osteoporosis (brittle bones).  Hepatitis C: Get tested at least once in your life.  STIs (sexually  transmitted infections)  Before age 24: Ask your care team if you should be screened for STIs.  After age 24: Get screened for STIs if you're at risk. You are at risk for STIs (including HIV) if:  You are sexually active with more than one person.  You don't use condoms every time.  You or a partner was diagnosed with a sexually transmitted infection.  If you are at risk for HIV, ask about PrEP medicine to prevent HIV.  Get tested for HIV at least once in your life, whether you are at risk for HIV or not.  Cancer screening tests  Cervical cancer screening: If you have a cervix, begin getting regular cervical cancer screening tests starting at age 21.  Breast cancer scan (mammogram): If you've ever had breasts, begin having regular mammograms starting at age 40. This is a scan to check for breast cancer.  Colon cancer screening: It is important to start screening for colon cancer at age 45.  Have a colonoscopy test every 10 years (or more often if you're at risk) Or, ask your provider about stool tests like a FIT test every year or Cologuard test every 3 years.  To learn more about your testing options, visit:   .  For help making a decision, visit:   https://bit.ly/mw89507.  Prostate cancer screening test: If you have a prostate, ask your care team if a prostate cancer screening test (PSA) at age 55 is right for you.  Lung cancer screening: If you are a current or former smoker ages 50 to 80, ask your care team if ongoing lung cancer screenings are right for you.  For informational purposes only. Not to replace the advice of your health care provider. Copyright   2023 [employer] All rights reserved. Clinically reviewed by the [facility] Transitions Program. [employer] [id number] - REV 01/24.

## 2025-01-07 NOTE — PROGRESS NOTES
"Preventive Care Visit  [facility]  Emmy Vences NP, Family Medicine  Jan 7, 2025      Assessment & Plan     (Z12.11) Screen for colon cancer  (primary encounter diagnosis)  Comment:   Plan: CRYSTAL([employer])        Has never had screening, agrees to crystal    (J45.20) Mild intermittent asthma without complication  Comment:   Plan: albuterol (PROAIR HFA/PROVENTIL HFA/VENTOLIN         HFA) 108 (90 Base) MCG/ACT inhaler        Well controlled    (I10) Primary hypertension  Comment:   Plan: hydrochlorothiazide (HYDRODIURIL) 25 MG tablet,        lisinopril (ZESTRIL) 30 MG tablet        States home bp's normal, will continue with 4 med management    (E03.9) Hypothyroidism, unspecified type  Comment:   Plan: REVIEW OF HEALTH MAINTENANCE PROTOCOL ORDERS,         TSH with free T4 reflex            (M85.80) Osteopenia, unspecified location  Comment:   Plan: has been years since dexa, will schedule at [facility]    (E78.5) Hyperlipidemia, unspecified hyperlipidemia type  Comment:   Plan: Lipid panel reflex to direct LDL Non-fasting        Not fasting    (Z78.0) Asymptomatic menopause  Comment:   Plan: DEXA HIP/PELVIS/SPINE - Future            (Z00.00) Encounter for Medicare annual wellness exam  Comment:   Plan:     Is fully retired, feels depression has lifted since death of her   Continues to be tobacco free for a few years          BMI  Estimated body mass index is 29.09 kg/m  as calculated from the following:    Height as of this encounter: 1.594 m (5' 2.75\").    Weight as of this encounter: 73.9 kg (162 lb 14.4 oz).       Counseling  Appropriate preventive services were addressed with this patient via screening, questionnaire, or discussion as appropriate for fall prevention, nutrition, physical activity, Tobacco-use cessation, social engagement, weight loss and cognition.  Checklist reviewing preventive services available has been given to the patient.  Reviewed patient's diet, " addressing concerns and/or questions.           Clarence Matthews is a 69 year old, presenting for the following:    FH--osteopenia;   Annual Visit        1/7/2025    11:22 AM   Additional Questions   Roomed by Ann RADFORD CMA   Accompanied by None                   Health Care Directive  Patient does not have a Health Care Directive: Patient states has Advance Directive and will bring in a copy to clinic.      1/7/2025   General Health   How would you rate your overall physical health? Good   Feel stress (tense, anxious, or unable to sleep) Not at all         1/7/2025   Nutrition   Diet: Regular (no restrictions)         1/7/2025   Exercise   Days per week of moderate/strenous exercise 5 days         1/7/2025   Social Factors   Frequency of gathering with friends or relatives More than three times a week   Worry food won't last until get money to buy more No   Food not last or not have enough money for food? No   Do you have housing? (Housing is defined as stable permanent housing and does not include staying ouside in a car, in a tent, in an abandoned building, in an overnight shelter, or couch-surfing.) Yes   Are you worried about losing your housing? No   Lack of transportation? No   Unable to get utilities (heat,electricity)? No         1/7/2025   Fall Risk   Fallen 2 or more times in the past year? No   Trouble with walking or balance? No          1/7/2025   Activities of Daily Living- Home Safety   Needs help with the following daily activites None of the above   Safety concerns in the home None of the above         1/7/2025   Dental   Dentist two times every year? Yes         1/7/2025   Hearing Screening   Hearing concerns? None of the above         1/7/2025   Driving Risk Screening   Patient/family members have concerns about driving No         1/7/2025   General Alertness/Fatigue Screening   Have you been more tired than usual lately? No         1/7/2025   Urinary Incontinence Screening   Bothered by  leaking urine in past 6 months No         1/7/2025   TB Screening   Were you born outside of the [place]? No         Today's PHQ-2 Score:       1/7/2025    11:09 AM   PHQ-2 ( 1999 [employer])   Q1: Little interest or pleasure in doing things 0   Q2: Feeling down, depressed or hopeless 0   PHQ-2 Score 0    Q1: Little interest or pleasure in doing things Not at all   Q2: Feeling down, depressed or hopeless Not at all   PHQ-2 Score 0       Patient-reported           1/7/2025   Substance Use   Alcohol more than 3/day or more than 7/wk No   Do you have a current opioid prescription? No   How severe/bad is pain from 1 to 10? 0/10 (No Pain)   Do you use any other substances recreationally? No     Social History     Tobacco Use    Smoking status: Former     Passive exposure: Past    Smokeless tobacco: Never   Vaping Use    Vaping status: Never Used          Mammogram Screening - Mammogram every 1-2 years updated in Health Maintenance based on mutual decision making      History of abnormal Pap smear: No - age 65 or older with adequate negative prior screening test results (3 consecutive negative cytology results, 2 consecutive negative cotesting results, or 2 consecutive negative HrHPV test results within 10 years, with the most recent test occurring within the recommended screening interval for the test used)                 Reviewed and updated as needed this visit by Provider     Meds                Lab work is in process  Current providers sharing in care for this patient include:  Patient Care Team:  Emmy Vences NP as PCP - General (Family Medicine)  Emmy Vences NP as Assigned PCP  Bradly Luna DO as MD (Cardiovascular Disease)    The following health maintenance items are reviewed in Epic and correct as of today:  Health Maintenance   Topic Date Due    RSV VACCINE (1 - Risk 60-74 years 1-dose series) Never done    ZOSTER IMMUNIZATION (2 of 3) 01/13/2017    COLORECTAL CANCER SCREENING  03/31/2020    JONAS   "11/06/2023    ASTHMA ACTION PLAN  11/17/2023    BMP  08/15/2024    LIPID  08/15/2024    INFLUENZA VACCINE (1) 09/01/2024    COVID-19 Vaccine (4 - 2024-25 season) 09/01/2024    TSH W/FREE T4 REFLEX  02/15/2025    ASTHMA CONTROL TEST  07/07/2025    MAMMO SCREENING  10/01/2025    MEDICARE ANNUAL WELLNESS VISIT  01/07/2026    ANNUAL REVIEW OF HM ORDERS  01/07/2026    FALL RISK ASSESSMENT  01/07/2026    GLUCOSE  08/15/2026    DTAP/TDAP/TD IMMUNIZATION (3 - Td or Tdap) 11/25/2026    ADVANCE CARE PLANNING  01/07/2030    HEPATITIS C SCREENING  Completed    PHQ-2 (once per calendar year)  Completed    Pneumococcal Vaccine: 50+ Years  Completed    HPV IMMUNIZATION  Aged Out    MENINGITIS IMMUNIZATION  Aged Out    RSV MONOCLONAL ANTIBODY  Aged Out    LUNG CANCER SCREENING  Discontinued            Objective    Exam  /65   Pulse 61   Temp 98  F (36.7  C)   Resp 12   Ht 1.594 m (5' 2.75\")   Wt 73.9 kg (162 lb 14.4 oz)   LMP  (LMP Unknown)   SpO2 98%   BMI 29.09 kg/m     Estimated body mass index is 29.09 kg/m  as calculated from the following:    Height as of this encounter: 1.594 m (5' 2.75\").    Weight as of this encounter: 73.9 kg (162 lb 14.4 oz).    Physical Exam  GENERAL: alert and no distress  EYES: Eyes grossly normal to inspection, PERRL and conjunctivae and sclerae normal  NECK: no adenopathy, no asymmetry, masses, or scars  RESP: lungs clear to auscultation - no rales, rhonchi or wheezes  CV: regular rate and rhythm, normal S1 S2, no S3 or S4, no murmur, click or rub, no peripheral edema  MS: no gross musculoskeletal defects noted, no edema  NEURO: Normal strength and tone, mentation intact and speech normal  PSYCH: mentation appears normal, affect normal/bright         1/7/2025   Mini Cog   Clock Draw Score 2 Normal   3 Item Recall 3 objects recalled   Mini Cog Total Score 5              Signed Electronically by: Emmy Vences NP    "

## 2025-01-08 ENCOUNTER — PATIENT OUTREACH (OUTPATIENT)
Dept: CARE COORDINATION | Facility: CLINIC | Age: 70
End: 2025-01-08
Payer: COMMERCIAL

## 2025-01-08 RX ORDER — LEVOTHYROXINE SODIUM 125 UG/1
TABLET ORAL
Qty: 45 TABLET | Refills: 4 | Status: SHIPPED | OUTPATIENT
Start: 2025-01-08

## 2025-01-08 RX ORDER — LEVOTHYROXINE SODIUM 112 UG/1
TABLET ORAL
Qty: 45 TABLET | Refills: 4 | Status: SHIPPED | OUTPATIENT
Start: 2025-01-08

## 2025-01-28 LAB — NONINV COLON CA DNA+OCC BLD SCRN STL QL: NEGATIVE

## 2025-09-01 ENCOUNTER — PATIENT OUTREACH (OUTPATIENT)
Dept: CARE COORDINATION | Facility: CLINIC | Age: 70
End: 2025-09-01
Payer: COMMERCIAL